# Patient Record
Sex: FEMALE | Race: WHITE | Employment: FULL TIME | URBAN - METROPOLITAN AREA
[De-identification: names, ages, dates, MRNs, and addresses within clinical notes are randomized per-mention and may not be internally consistent; named-entity substitution may affect disease eponyms.]

---

## 2017-12-14 PROBLEM — S82.109A CLOSED FRACTURE OF UPPER END OF TIBIA: Status: ACTIVE | Noted: 2017-12-14

## 2017-12-15 PROBLEM — S82.101A: Status: ACTIVE | Noted: 2017-12-15

## 2017-12-28 DIAGNOSIS — M79.661 PAIN IN RIGHT LOWER LEG: Primary | ICD-10-CM

## 2018-01-11 DIAGNOSIS — M89.8X6 PAIN OF RIGHT TIBIA: Primary | ICD-10-CM

## 2018-01-15 ENCOUNTER — HOSPITAL ENCOUNTER (OUTPATIENT)
Dept: PHYSICAL THERAPY | Age: 58
Discharge: HOME | End: 2018-01-31
Admitting: ORTHOPAEDIC SURGERY

## 2018-01-15 NOTE — PLAN OF CARE
[x]Dermatomes/Myotomes intact    [x]Reflexes equal and normal bilaterally   []Other:    Joint mobility:    []Normal    [x]Hypo-PF, patellar   []Hyper    Palpation: TTP superior incision    Functional Mobility/Transfers: Gisella with w/c and SW    Posture:     Bandages/Dressings/Incisions: incision healed superior and distal portion, still has ~1cm area of scabbing along lateral ant tibia that is filled with granulation tissue but not fully approximated (no drainage noted and pt has been monitoring) and on ~2mm spot distally that is just scabbed. Gait: (include devices/WB status) Pt instructed she could be TTWB with SW, wearing knee immobilizer. Did not bring immobilizer or SW to [facility] (has w/c) and do not have PT order so did not assess gait with SW today. Orthopedic Special Tests:                        [x] Patient history, allergies, meds reviewed. Medical chart reviewed. See intake form. Review Of Systems (ROS):  [x]Performed Review of systems (Integumentary, CardioPulmonary, Neurological) by intake and observation. Intake form has been scanned into medical record. Patient has been instructed to contact their primary care physician regarding ROS issues if not already being addressed at this time.       Co-morbidities/Complexities (which will affect course of rehabilitation):   []None           Arthritic conditions   []Rheumatoid arthritis (M05.9)  []Osteoarthritis (M19.91)   Cardiovascular conditions   []Hypertension (I10)  []Hyperlipidemia (E78.5)  []Angina pectoris (I20)  []Atherosclerosis (I70)   Musculoskeletal conditions   []Disc pathology   []Congenital spine pathologies   [x]Prior surgical intervention  []Osteoporosis (M81.8)  []Osteopenia (M85.8)   Endocrine conditions   []Hypothyroid (E03.9)  []Hyperthyroid Gastrointestinal conditions   []Constipation (J73.85)   Metabolic conditions   []Morbid obesity (E66.01)  []Diabetes type 1(E10.65) or 2 (E11.65)   []Neuropathy (G60.9)     Pulmonary

## 2018-01-18 ENCOUNTER — HOSPITAL ENCOUNTER (OUTPATIENT)
Dept: PHYSICAL THERAPY | Age: 58
Discharge: HOME | End: 2018-01-18
Admitting: ORTHOPAEDIC SURGERY

## 2018-01-18 NOTE — FLOWSHEET NOTE
self care and ADLs  [] (37262) Gait Re-education- Provided training and instruction to the patient for proper LE, core and proximal hip recruitment and positioning and eccentric body weight control with ambulation re-education including up and down stairs     Home Exercise Program:    [x] (30839) Reviewed/Progressed HEP activities related to strengthening, flexibility, endurance, ROM of core, proximal hip and LE for functional self-care, mobility, lifting and ambulation/stair navigation   [] (48190)Reviewed/Progressed HEP activities related to improving balance, coordination, kinesthetic sense, posture, motor skill, proprioception of core, proximal hip and LE for self care, mobility, lifting, and ambulation/stair navigation      Manual Treatments:  PROM / STM / Oscillations-Mobs:  G-I, II, III, IV (PA's, Inf., Post.)  [x] (20210) Provided manual therapy to mobilize LE, proximal hip and/or LS spine soft tissue/joints for the purpose of modulating pain, promoting relaxation,  increasing ROM, reducing/eliminating soft tissue swelling/inflammation/restriction, improving soft tissue extensibility and allowing for proper ROM for normal function with self care, mobility, lifting and ambulation. Modalities:  Declined ice/stim but will at home if inc'd pain    Charges:  Timed Code Treatment Minutes: 43'   Total Treatment Minutes: 43'     [] EVAL (LOW) 43882 (typically 20 minutes face-to-face)  [] EVAL (MOD) 47927 (typically 30 minutes face-to-face)  [] EVAL (HIGH) 32498 (typically 45 minutes face-to-face)  [] RE-EVAL     [x] TG(68557) x  2   [] IONTO  [] NMR (77969) x      [] VASO  [x] Manual (13164) x  1    [] Other:  [] TA x       [] Mech Traction (08695)  [] ES(attended) (25140)      [] ES (un) (57101):     GOALS: Short Term Goals: To be achieved in: 2 weeks  1. Independent in HEP and progression per patient tolerance, in order to prevent re-injury.    2. Patient will have a decrease in pain to facilitate improvement in movement, function, and ADLs as indicated by Functional Deficits. Long Term Goals: To be achieved in: 12 weeks  1. Disability index score of 20% or less for the LEFS to assist with reaching prior level of function. 2. Patient will demonstrate increased AROM to 0-125 to allow for proper joint functioning as indicated by patients Functional Deficits: squatting  3. Patient will demonstrate an increase in Strength to 5/5 good proximal hip strength and control, within 5lb HHD in LE to allow for proper functional mobility as indicated by patients Functional Deficits: ascending/descending steps with reciprocal pattern without AD. 4. Patient will return to ambulating community distances without AD without gait deviation. 5. Pt will participate in [address] [place] [address] for ability to get back to full work duties (lifting/carrying/pushing/pulling, prolonged walking/standing. Progression Towards Functional goals:  [x] Patient is progressing as expected towards functional goals listed. [] Progression is slowed due to complexities listed. [] Progression has been slowed due to co-morbidities. [] Plan just implemented, too soon to assess goals progression  [] Other:     ASSESSMENT:  Pt janeen tx well today. Educated pt on TTWB status and instructed pt on using SW with ambulation at [employer]. Instructed pt to use KI when ambulating at TTWB and to ice 2-3x per day to help with swelling and pain in R knee. Pt shows progress with inc strength performing exercises, specifically SLR flex. Treatment/Activity Tolerance:  [x] Patient tolerated treatment well [] Patient limited by fatique  [] Patient limited by pain  [] Patient limited by other medical complications  [] Other:     Prognosis: [x] Good [] Fair  [] Poor    Patient Requires Follow-up: [x] Yes  [] No    PLAN: Attempt gait training on stairs NV with crutches. Cont to progress exercises NV as janeen.     [x] Continue per plan of care [] Alter current plan (see comments)  [] Plan

## 2018-01-22 ENCOUNTER — HOSPITAL ENCOUNTER (OUTPATIENT)
Dept: PHYSICAL THERAPY | Age: 58
Discharge: HOME | End: 2018-01-22
Admitting: ORTHOPAEDIC SURGERY

## 2018-01-23 NOTE — OP NOTE
advance home exercise program to match level of function. [] Continue rehabitation due to objective improvement and continued functional deficits with progression to work conditioning. [] Discharge to post-rehab program secondary to maximizing \"medical necessity\" of physical / occupational therapy. [] Discharge independent in a home program as:  [] All goals achieved  [] Maximized \"medical necessity\" of physical / occupational therapy  [] No subjective or objective improvements    Plan: Cont to progress ROM, exercises NV as janeen, pending progression of [address] based on MD f/u.   Electronically signed by: Sola Holland, PT, DPT     Physician Recommendations:  [] Follow treatment plan as above [] Discontinue physical therapy  [] Change plan to: _______________________________________________________________

## 2018-01-25 ENCOUNTER — HOSPITAL ENCOUNTER (OUTPATIENT)
Dept: PHYSICAL THERAPY | Age: 58
Discharge: HOME | End: 2018-01-25
Admitting: ORTHOPAEDIC SURGERY

## 2018-01-25 DIAGNOSIS — M89.8X6 PAIN OF RIGHT TIBIA: Primary | ICD-10-CM

## 2018-01-25 NOTE — FLOWSHEET NOTE
[facility], [address] [place]  Phone: [phone]  Fax [phone]    Physical Therapy Daily Treatment Note  Date:  2018    Patient Name:  Cindy Barbosa    :  1960  MRN: [id number]  Restrictions/Precautions:    Medical/Treatment Diagnosis Information:  Diagnosis: S82.141D (ICD-10-CM) - Displaced bicondylar fracture of right tibia, subsequent encounter for closed fracture with routine healing  Treatment Diagnosis: R knee pain M25.561, R knee stiffness M25.661, difficulty walking B34.3  Insurance/Certification information:     Physician Information:  Referring Practitioner: marta  Plan of care signed (Y/N):     Date of Patient follow up with Physician: 18    G-Code (if applicable):   CM   Date G-Code Applied:  1/15/18  PT G-Codes  Functional Assessment Tool Used: LEFS  Score: 78%  Functional Limitation: Mobility: Walking and moving around  Mobility: Walking and Moving Around Current Status (): At least 80 percent but less than 100 percent impaired, limited or restricted  Mobility: Walking and Moving Around Goal Status (): At least 20 percent but less than 40 percent impaired, limited or restricted    Progress Note: [x]  Yes  []  No  Next due by: Visit #10       Latex Allergy:  [x]NO      []YES  Preferred Language for Healthcare:   [x]English       []other:     Visit # Insurance Allowable Requires auth   4 30    [x]no        []yes:       Pain level:  0/10     SUBJECTIVE: Pt had f/u with MD, who states she's healing well on xrays. She can progress [address] to 25% and he is still not sure about return to work in early February but will see her again 17.     OBJECTIVE:   Observation: Gr I edema, gr II edema med/lat tib distal and lat to incision, pitting edema gr II to mid calf; - Hang's; incision fully closed now, scabbing has fallen off; restriction of skin/fascia and scar noted  Test measurements: -4-104 deg knee flex with knee AAOM flex on SB with strap ,ext to 0 following mobs, stretching    RESTRICTIONS/PRECAUTIONS: s/p R tibial plateau fx ORIF DOS 36/48/35; Per MD 25% WB'ing as of 1/24/18 and can progress by 25% weekly.      Exercises/Interventions:     Therapeutic Ex Sets/sec Reps Notes   Pt ed: 25% WB'ing and progression each week, need to build quad/glute strength so she can WB better, use of SW for comm dis, crutches around house as able      Quad iso-started with towel under knee then removed once inc'd ext ROM 10\"  10\" 10  10    Knee AAROM flex/ext on SB  With strap  10\"  10\" x10  x10    Seated LE dangle (assist from LLE)    SLR-AROM  10    SL hip abd  10    SL hip add  10    bridge 5\" 2x10    Long-sitting gastroc s  HS stretch 30\"x3 ea  progress to standing st NV   Standing hip abd, flex, march  NV add ext if able to keep quad iso  x15 ea With AT  L stance, R active  Table-side   Standing knee flex  3\"x10 With AT  L stance, R active   Seated R LE flexion 5\" x10 100 deg   SAQ  LAQ-AAROM initially 5\"  5\" 2x10  x10    gait training (25% WB)      stair training  10'  With AT  Backwards up steps  Forwards down          bike for ROM 5'  Full revolutions after 2-3 min   Manual Intervention      Pat mobs 5'  instructed pt for HEP   PROM R knee flex, ext 4'     STM peroneals, medial shin along post tib 10'                       NMR re-education                                              Therapeutic Exercise and NMR EXR  [x] (15036) Provided verbal/tactile cueing for activities related to strengthening, flexibility, endurance, ROM for improvements in LE, proximal hip, and core control with self care, mobility, lifting, ambulation.  [] (29345) Provided verbal/tactile cueing for activities related to improving balance, coordination, kinesthetic sense, posture, motor skill, proprioception  to assist with LE, proximal hip, and core control in self care, mobility, lifting, ambulation and eccentric single leg control. NMR and Therapeutic Activities:    [] (05284 or 26148) Provided verbal/tactile cueing for activities related to improving balance, coordination, kinesthetic sense, posture, motor skill, proprioception and motor activation to allow for proper function of core, proximal hip and LE with self care and ADLs  [x] (67250) Gait Re-education- Provided training and instruction to the patient for proper LE, core and proximal hip recruitment and positioning and eccentric body weight control with ambulation re-education including up and down stairs     Home Exercise Program:    [x] (88934) Reviewed/Progressed HEP activities related to strengthening, flexibility, endurance, ROM of core, proximal hip and LE for functional self-care, mobility, lifting and ambulation/stair navigation   [] (22983)Reviewed/Progressed HEP activities related to improving balance, coordination, kinesthetic sense, posture, motor skill, proprioception of core, proximal hip and LE for self care, mobility, lifting, and ambulation/stair navigation      Manual Treatments:  PROM / STM / Oscillations-Mobs:  G-I, II, III, IV (PA's, Inf., Post.)  [x] (05332) Provided manual therapy to mobilize LE, proximal hip and/or LS spine soft tissue/joints for the purpose of modulating pain, promoting relaxation,  increasing ROM, reducing/eliminating soft tissue swelling/inflammation/restriction, improving soft tissue extensibility and allowing for proper ROM for normal function with self care, mobility, lifting and ambulation.      Modalities: PM and vaso with elevation x15'    Charges:  Timed Code Treatment Minutes: 40'   Total Treatment Minutes: 54'     [] EVAL (LOW) 04730 (typically 20 minutes face-to-face)  [] EVAL (MOD) 58759 (typically 30 minutes face-to-face)  [] EVAL (HIGH) 90185 (typically 45 minutes face-to-face)  [] RE-EVAL     [x] KN(72460) x  2   [] IONTO  [] NMR (18080) x      [] VASO-with estim  [x] Manual (71979) x  1    [] Other:Gait x1  [] TA x       [] [facility] Traction (61186)  [] ES(attended) (57764)      [x] ES (un) (85937):     GOALS: Short Term Goals: To be achieved in: 2 weeks  1. Independent in HEP and progression per patient tolerance, in order to prevent re-injury. 2. Patient will have a decrease in pain to facilitate improvement in movement, function, and ADLs as indicated by Functional Deficits. Long Term Goals: To be achieved in: 12 weeks  1. Disability index score of 20% or less for the LEFS to assist with reaching prior level of function. 2. Patient will demonstrate increased AROM to 0-125 to allow for proper joint functioning as indicated by patients Functional Deficits: squatting  3. Patient will demonstrate an increase in Strength to 5/5 good proximal hip strength and control, within 5lb HHD in LE to allow for proper functional mobility as indicated by patients Functional Deficits: ascending/descending steps with reciprocal pattern without AD. 4. Patient will return to ambulating community distances without AD without gait deviation. 5. Pt will participate in [employer] for ability to get back to full work duties (lifting/carrying/pushing/pulling, prolonged walking/standing. Progression Towards Functional goals:  [x] Patient is progressing as expected towards functional goals listed. [] Progression is slowed due to complexities listed. [] Progression has been slowed due to co-morbidities. [] Plan just implemented, too soon to assess goals progression  [] Other:     ASSESSMENT:  Pt's ROM is improving and pain remains well managed with progression to 25% WB'ing this date. She was able to demonstrate safe mobility on steps with crutches with AT for SBA. She was able to revolve fully on bike after warm-up. Her RLE edema improved with mobility this tx, but added stim/vaso for pain and edema given inc'd exercise and WB'ing this date.  She will benefit from continued proximal strength and quad activation to improve her gait

## 2018-01-29 ENCOUNTER — HOSPITAL ENCOUNTER (OUTPATIENT)
Dept: PHYSICAL THERAPY | Age: 58
Discharge: HOME | End: 2018-01-29
Admitting: ORTHOPAEDIC SURGERY

## 2018-01-29 NOTE — FLOWSHEET NOTE
fallen off; restriction of skin/fascia and scar noted  Test measurements: -4-105 deg knee flex with knee AAOM flex on SB with strap ,ext to 0 following mobs, stretching    RESTRICTIONS/PRECAUTIONS: s/p R tibial plateau fx ORIF DOS 74/50/91; Per MD 25% [address] as of 1/24/18 and can progress by 25% weekly.      Exercises/Interventions:     Therapeutic Ex Sets/sec Reps Notes   Pt ed: 25% WB'ing and progression each week, need to build quad/glute strength so she can WB better, use of SW for comm dis, crutches around house as able, still need to use crutches/AD at all times d/t 25% WB status 2'     Quad iso-started with no towel, then towel under her ankle once inc'd ext ROM 10\"  10\" 5  5    Knee AAROM flex/ext on SB  With strap  10\"  10\" x10  x10    Seated LE dangle (assist from LLE)    SLR-AROM 2  2 10  10   With 1.5 # above knee   SL hip abd 3 10 tactile cues needed for form   SL hip add 2 10    bridge 5\" 2x10    Long-sitting gastroc s  HS stretch 30\"x3 ea  progress to standing st NV   Standing hip abd, flex, ext,     2x10 ea   L stance, R active  Table-side   Standing knee flex   With AT  L stance, R active   Seated R LE flexion 100 deg   SAQ  LAQ-AROM  5\"  5\" 2x10  x10    gait training (25% WB)      stair training    NV increase to 50% WB  Backwards up steps  Forwards down          bike for ROM 5'  Full revolutions after 2-3 min   Manual Intervention      Pat mobs 5'  instructed pt for HEP   PROM R knee flex, ext 4'     STM peroneals, medial shin along post tib 10'                       NMR re-education                                              Therapeutic Exercise and NMR EXR  [x] (65793) Provided verbal/tactile cueing for activities related to strengthening, flexibility, endurance, ROM for improvements in LE, proximal hip, and core control with self care, mobility, lifting, ambulation.  [] (46179) Provided verbal/tactile cueing for activities related to improving balance, coordination, kinesthetic sense, posture, face-to-face)  [] RE-EVAL     [x] GX(59283) x  2   [] IONTO  [] NMR (69445) x      [x] VASO  [x] Manual (19921) x  1    [] Other:Gait x1  [] TA x       [] Mech Traction (80491)  [] ES(attended) (87804)      [] ES (un) (03849):     GOALS: Short Term Goals: To be achieved in: 2 weeks  1. Independent in HEP and progression per patient tolerance, in order to prevent re-injury. 2. Patient will have a decrease in pain to facilitate improvement in movement, function, and ADLs as indicated by Functional Deficits. Long Term Goals: To be achieved in: 12 weeks  1. Disability index score of 20% or less for the LEFS to assist with reaching prior level of function. 2. Patient will demonstrate increased AROM to 0-125 to allow for proper joint functioning as indicated by patients Functional Deficits: squatting  3. Patient will demonstrate an increase in Strength to 5/5 good proximal hip strength and control, within 5lb HHD in LE to allow for proper functional mobility as indicated by patients Functional Deficits: ascending/descending steps with reciprocal pattern without AD. 4. Patient will return to ambulating community distances without AD without gait deviation. 5. Pt will participate in [address] [place] [address] for ability to get back to full work duties (lifting/carrying/pushing/pulling, prolonged walking/standing. Progression Towards Functional goals:  [x] Patient is progressing as expected towards functional goals listed. [] Progression is slowed due to complexities listed. [] Progression has been slowed due to co-morbidities. [] Plan just implemented, too soon to assess goals progression  [] Other:     ASSESSMENT:  Pt did well with today's session. She did report some pain with end range knee ROM, but did not push through painful range. Mild limitation in knee ext remains, but improves with MTT. Able to increase reps with exercises today. Did not complete PM in PT error, but may continue NV for pain control.  She will benefit

## 2018-02-01 ENCOUNTER — HOSPITAL ENCOUNTER (OUTPATIENT)
Dept: PHYSICAL THERAPY | Age: 58
Discharge: HOME | End: 2018-02-02
Admitting: ORTHOPAEDIC SURGERY

## 2018-02-01 ENCOUNTER — HOSPITAL ENCOUNTER (OUTPATIENT)
Dept: PHYSICAL THERAPY | Age: 58
Discharge: HOME | End: 2018-02-28
Attending: ORTHOPAEDIC SURGERY | Admitting: ORTHOPAEDIC SURGERY

## 2018-02-01 NOTE — FLOWSHEET NOTE
bilateral crutches- focusing on heel-toe gait, quad contraction for stance phase, crutch sequencing 10'  Pt exhausted by end of this   NV stair training with crutches                                  Therapeutic Exercise and NMR EXR  [x] (40405) Provided verbal/tactile cueing for activities related to strengthening, flexibility, endurance, ROM for improvements in LE, proximal hip, and core control with self care, mobility, lifting, ambulation.  [] (00917) Provided verbal/tactile cueing for activities related to improving balance, coordination, kinesthetic sense, posture, motor skill, proprioception  to assist with LE, proximal hip, and core control in self care, mobility, lifting, ambulation and eccentric single leg control.      NMR and Therapeutic Activities:    [] (23494 or 75183) Provided verbal/tactile cueing for activities related to improving balance, coordination, kinesthetic sense, posture, motor skill, proprioception and motor activation to allow for proper function of core, proximal hip and LE with self care and ADLs  [x] (73684) Gait Re-education- Provided training and instruction to the patient for proper LE, core and proximal hip recruitment and positioning and eccentric body weight control with ambulation re-education including up and down stairs     Home Exercise Program:    [x] (97693) Reviewed/Progressed HEP activities related to strengthening, flexibility, endurance, ROM of core, proximal hip and LE for functional self-care, mobility, lifting and ambulation/stair navigation   [] (73723)Reviewed/Progressed HEP activities related to improving balance, coordination, kinesthetic sense, posture, motor skill, proprioception of core, proximal hip and LE for self care, mobility, lifting, and ambulation/stair navigation      Manual Treatments:  PROM / STM / Oscillations-Mobs:  G-I, II, III, IV (PA's, Inf., Post.)  [x] (38377) Provided manual therapy to mobilize LE, proximal hip and/or LS spine soft is progressing as expected towards functional goals listed. [] Progression is slowed due to complexities listed. [] Progression has been slowed due to co-morbidities. [] Plan just implemented, too soon to assess goals progression  [] Other:     ASSESSMENT:  Pt is able to achieve full knee ext fairly easily with stretches, quad sets. However, her flexion ROM is still fairly painful and restricted to ~105 today. Worked on gait training with her crutches as she may be 50% WB as of 1/31/18. She did not have increased pain, but she required extensive cueing for gait sequencing as she had a tendency to allow her R knee to unlock and go limp when in stance phase. She will benefit from continued proximal strength and quad activation to improve her gait for progression of WB'ing. She really wants to go back to work full-duty on 2/12/18, but do not anticipate pt will be ready by this time in order to safely perform her job duties. Spoke with pt today on this subject and she did become emotional about it. Tried to reinforce that she will want to return to work when she is fully ready, not just getting back as soon as she is off of the crutches.     Treatment/Activity Tolerance:  [x] Patient tolerated treatment well [] Patient limited by fatique  [] Patient limited by pain  [] Patient limited by other medical complications  [] Other:     Prognosis: [x] Good [] Fair  [] Poor    Patient Requires Follow-up: [x] Yes  [] No    PLAN: Progress ROM, prox strength while maintaining [address] prec's- progress to 75% WB 2/7/18  [x] Continue per plan of care [] Alter current plan (see comments)  [] Plan of care initiated [] Hold pending MD visit [] Discharge    Electronically signed by: Ginger Pablo, PT, DPT

## 2018-02-05 ENCOUNTER — HOSPITAL ENCOUNTER (OUTPATIENT)
Dept: PHYSICAL THERAPY | Age: 58
Discharge: HOME | End: 2018-02-06
Admitting: ORTHOPAEDIC SURGERY

## 2018-02-05 NOTE — FLOWSHEET NOTE
[facility], [address]  Phone: [phone]  Fax [phone]    Physical Therapy Daily Treatment Note  Date:  2018    Patient Name:  Gino Medrano    :  1960  MRN: [id number]  Restrictions/Precautions:    Medical/Treatment Diagnosis Information:  Diagnosis: S82.141D (ICD-10-CM) - Displaced bicondylar fracture of right tibia, subsequent encounter for closed fracture with routine healing  Treatment Diagnosis: R knee pain M25.561, R knee stiffness M25.661, difficulty walking T62.7  Insurance/Certification information:     Physician Information:  Referring Practitioner: marta  Plan of care signed (Y/N):     Date of Patient follow up with Physician: 18    G-Code (if applicable):   CM   Date G-Code Applied:  1/15/18  PT G-Codes  Functional Assessment Tool Used: LEFS  Score: 78%  Functional Limitation: Mobility: Walking and moving around  Mobility: Walking and Moving Around Current Status (): At least 80 percent but less than 100 percent impaired, limited or restricted  Mobility: Walking and Moving Around Goal Status (): At least 20 percent but less than 40 percent impaired, limited or restricted    Progress Note: [x]  Yes  []  No  Next due by: Visit #10       Latex Allergy:  [x]NO      []YES  Preferred Language for Healthcare:   [x]English       []other:     Visit # Insurance Allowable Requires auth   7 30    [x]no        []yes:       Pain level:  0/10     SUBJECTIVE: Pt reports soreness following her exercises but no pain just with ambulation around her home.  She is very frustrated that she may still not be able to go back to work on Monday, is optimistic if she pushes herself she could (but she does admit that it is expecting too much and she will listen to advice of PT and MD.)     OBJECTIVE:   Observation: Gr I edema, gr I edema med/lat tib distal and lat to incision, no pitting edema noted; incision is healing well but restriction of skin/fascia and scar noted  Test measurements: -2-105 deg knee flex with knee AAOM flex on SB with strap ,ext to 0 following mobs, stretching (did not measure flexion again after biking)    RESTRICTIONS/PRECAUTIONS: s/p R tibial plateau fx ORIF DOS 04/93/10; Per MD 25% [address] as of 1/24/18 and can progress by 25% weekly. (75% WB'ing as of 2/7/18)    Exercises/Interventions:     Therapeutic Ex Sets/sec Reps Notes   Pt ed: 50-75% WB'ing progression this week, need to build quad/glute strength so she can WB better, use of SW for comm dis still if fatigued and crutches around house as able, still need to use crutches/AD at all times d/t 75% WB status; she likely will not be ready to return to work the second week of February as she will likely still be working on walking normally at that time; it is important to return to work when she is fully ready and not to push it/cause increased pain because she returns too quickly. 10'  Pt tearful at fact she may not be able to go back to work yet next week.    Quad iso-started with no towel, then towel under her ankle once inc'd ext ROM 10\"  10\" 10  10    Knee AAROM flex/ext on SB  With strap on mat table 10\"  10\" x10  x10    Seated LE dangle (assist from LLE)    SLR-AROM   2   10   With 1.5 # above knee   SL hip abd- bobbi wall slide 3 10 tactile cues needed for form   SL hip add 2 10 1.5#    HEP   Bridge  Sl bridge-RLE  5\" 2x10  x10 With green band above knees   Standing gastroc s  HS stretch-long-sitting 30\"x3 ea     Standing hip abd, flex, ext,     2x10 ea L stance, R active  Table-side   Standing knee flex  3\"2x10 L stance, R active   Seated R LE flexion 100 deg   SAQ with VMO squeeze  LAQ-AROM with VMO squeeze 5\"  5\" 2x10  2x10    Mini squats  2x10 Chair for cueing, attempt for 50% weight through RLE                bike for ROM 5'  Non-billable after set-up  Full revolutions    Manual Intervention      Pat mobs 5' to full work duties (lifting/carrying/pushing/pulling, prolonged walking/standing. Progression Towards Functional goals:  [x] Patient is progressing as expected towards functional goals listed. [] Progression is slowed due to complexities listed. [] Progression has been slowed due to co-morbidities. [] Plan just implemented, too soon to assess goals progression  [] Other:     ASSESSMENT:  Pt tolerated progression of WB'ing to around 75% while working on weight shifting with UE support on table, therefore added inc'd amb distance for gait training with B crutches. She was able to demonstrate inc'd quad activation in stance phase with consisted cueing while working with AT. Pt had min discomfort with SAQ/LAQ around medial patella that improved with ball squeeze to recruit VMO. Added mini squats this date, pt had min medial knee pain but again dissipated with focus on LE alignment. She will benefit from continued proximal strength and quad activation to improve her gait for progression of WB'ing. 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 Pt is still very discouraged about fact she may not be ready to work, but is agreeable to recommendations from PT and MD at her f/u on Wednesday. Treatment/Activity Tolerance:  [x] Patient tolerated treatment well [] Patient limited by fatique  [] Patient limited by pain  [] Patient limited by other medical complications  [] Other:     Prognosis: [x] Good [] Fair  [] Poor    Patient Requires Follow-up: [x] Yes  [] No    PLAN: Progress ROM, prox strength while maintaining 75% WB'ing this week. Has f/u with MD on Wednesday after PT-will need OP note.   [x] Continue per plan of care [] Alter current plan (see comments)  [] Plan of care initiated [] Hold pending MD visit [] Discharge    Electronically signed by:

## 2018-02-08 ENCOUNTER — HOSPITAL ENCOUNTER (OUTPATIENT)
Dept: PHYSICAL THERAPY | Age: 58
Discharge: HOME | End: 2018-02-09
Admitting: ORTHOPAEDIC SURGERY

## 2018-02-08 DIAGNOSIS — M89.8X6 PAIN OF RIGHT TIBIA: Primary | ICD-10-CM

## 2018-02-08 NOTE — FLOWSHEET NOTE
focusing on heel-toe gait, quad contraction for stance phase, crutch sequencing 8'       stair training with crutches 4'     Also adjusted crutches for 1 notch higher and hand  1 notch lower 3'  Improved shoulder/wrist comfort after adjustment                         Therapeutic Exercise and NMR EXR  [x] (34339) Provided verbal/tactile cueing for activities related to strengthening, flexibility, endurance, ROM for improvements in LE, proximal hip, and core control with self care, mobility, lifting, ambulation.  [] (97948) Provided verbal/tactile cueing for activities related to improving balance, coordination, kinesthetic sense, posture, motor skill, proprioception  to assist with LE, proximal hip, and core control in self care, mobility, lifting, ambulation and eccentric single leg control.      NMR and Therapeutic Activities:    [] (89929 or 61653) Provided verbal/tactile cueing for activities related to improving balance, coordination, kinesthetic sense, posture, motor skill, proprioception and motor activation to allow for proper function of core, proximal hip and LE with self care and ADLs  [x] (25334) Gait Re-education- Provided training and instruction to the patient for proper LE, core and proximal hip recruitment and positioning and eccentric body weight control with ambulation re-education including up and down stairs     Home Exercise Program:    [x] (15725) Reviewed/Progressed HEP activities related to strengthening, flexibility, endurance, ROM of core, proximal hip and LE for functional self-care, mobility, lifting and ambulation/stair navigation   [] (74902)Reviewed/Progressed HEP activities related to improving balance, coordination, kinesthetic sense, posture, motor skill, proprioception of core, proximal hip and LE for self care, mobility, lifting, and ambulation/stair navigation      Manual Treatments:  PROM / STM / Oscillations-Mobs:  G-I, II, III, IV (PA's, Inf., Post.)  [x] (85027) Provided walking/standing. Progression Towards Functional goals:  [x] Patient is progressing as expected towards functional goals listed. [] Progression is slowed due to complexities listed. [] Progression has been slowed due to co-morbidities. [] Plan just implemented, too soon to assess goals progression  [] Other:     ASSESSMENT:  Pt has functional ROM after manual tx and stretching, however still has some stiffness at end range flexion and extension. Her gait with B crutches is much improved and she is ambulating with 75% WB'ing on RLE household distances and on steps with step-to pattern with supervision. She was able to progress quad/hip strength this date, still has pain with resisted HS work so will work to progress this. 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 Pt is still very discouraged about fact she may not be ready to work, but is agreeable to recommendations from PT and MD at her f/u today. Treatment/Activity Tolerance:  [x] Patient tolerated treatment well [] Patient limited by fatique  [] Patient limited by pain  [] Patient limited by other medical complications  [] Other:     Prognosis: [x] Good [] Fair  [] Poor    Patient Requires Follow-up: [x] Yes  [] No    PLAN: Op note written for MD f/u; will progress as able given any new WB'ing allowance.     [x] Continue per plan of care [] Alter current plan (see comments)  [] Plan of care initiated [] Hold pending MD visit [] Discharge    Electronically signed by: Abeba Leyva, PT, DPT

## 2018-02-12 ENCOUNTER — HOSPITAL ENCOUNTER (OUTPATIENT)
Dept: PHYSICAL THERAPY | Age: 58
Discharge: HOME | End: 2018-02-13
Admitting: ORTHOPAEDIC SURGERY

## 2018-02-12 NOTE — FLOWSHEET NOTE
(15029) Reviewed/Progressed HEP activities related to strengthening, flexibility, endurance, ROM of core, proximal hip and LE for functional self-care, mobility, lifting and ambulation/stair navigation   [] (41031)Reviewed/Progressed HEP activities related to improving balance, coordination, kinesthetic sense, posture, motor skill, proprioception of core, proximal hip and LE for self care, mobility, lifting, and ambulation/stair navigation      Manual Treatments:  PROM / STM / Oscillations-Mobs:  G-I, II, III, IV (PA's, Inf., Post.)  [x] (42578) Provided manual therapy to mobilize LE, proximal hip and/or LS spine soft tissue/joints for the purpose of modulating pain, promoting relaxation,  increasing ROM, reducing/eliminating soft tissue swelling/inflammation/restriction, improving soft tissue extensibility and allowing for proper ROM for normal function with self care, mobility, lifting and ambulation. Modalities: PM and vasowith elevation x15'    Charges:  Timed Code Treatment Minutes: 45   Total Treatment Minutes:  65 (PM, bike)     [] EVAL (LOW) 39822 (typically 20 minutes face-to-face)  [] EVAL (MOD) 67732 (typically 30 minutes face-to-face)  [] EVAL (HIGH) 30627 (typically 45 minutes face-to-face)  [] RE-EVAL     [x] BH(14147) x  2   [] IONTO  [] NMR (66216) x      [] VASO  [x] Manual (83818) x  1    [] Other:Gait x1  [] TA x       [] Mech Traction (75297)  [] ES(attended) (47734)      [x] ES (un) (25848):     GOALS: Short Term Goals: To be achieved in: 2 weeks  1. Independent in HEP and progression per patient tolerance, in order to prevent re-injury. 2. Patient will have a decrease in pain to facilitate improvement in movement, function, and ADLs as indicated by Functional Deficits. Long Term Goals: To be achieved in: 12 weeks  1. Disability index score of 20% or less for the LEFS to assist with reaching prior level of function.    2. Patient will demonstrate increased AROM to 0-125 to allow for

## 2018-02-15 ENCOUNTER — HOSPITAL ENCOUNTER (OUTPATIENT)
Dept: PHYSICAL THERAPY | Age: 58
Discharge: HOME | End: 2018-02-16
Admitting: ORTHOPAEDIC SURGERY

## 2018-02-15 NOTE — FLOWSHEET NOTE
[facility], [address] [place]  Phone: [phone]  Fax [phone]    Physical Therapy Daily Treatment Note  Date:  2/15/2018    Patient Name:  Eliazar Powers    :  1960  MRN: [id number]  Restrictions/Precautions:    Medical/Treatment Diagnosis Information:  Diagnosis: S82.141D (ICD-10-CM) - Displaced bicondylar fracture of right tibia, subsequent encounter for closed fracture with routine healing  Treatment Diagnosis: R knee pain M25.561, R knee stiffness M25.661, difficulty walking Y83.7  Insurance/Certification information:     Physician Information:  Referring Practitioner: marta  Plan of care signed (Y/N):     Date of Patient follow up with Physician: 18    G-Code (if applicable):   CM   Date G-Code Applied:  1/15/18  PT G-Codes  Functional Assessment Tool Used: LEFS  Score: 78%  Functional Limitation: Mobility: Walking and moving around  Mobility: Walking and Moving Around Current Status (): At least 80 percent but less than 100 percent impaired, limited or restricted  Mobility: Walking and Moving Around Goal Status (): At least 20 percent but less than 40 percent impaired, limited or restricted    Progress Note: [x]  Yes  []  No  Next due by: Visit #10       Latex Allergy:  [x]NO      []YES  Preferred Language for Healthcare:   [x]English       []other:     Visit # Insurance Allowable Requires auth   9-prog note NV  30    [x]no        []yes:       Pain level:  0-1/10     SUBJECTIVE: Pt was not as sore after LV, she still feels some pulling around her incision and shin. OBJECTIVE:   Observation: Gr I edema R knee, gr I edema med/lat tib distal and lat to incision, no pitting edema noted; incision is healing well but restriction of skin/fascia and scar noted; gait with B crutches, lacks TKE in stance phase of gait  Test measurements: AROM 0-125 deg after stretching. Oscillations-Mobs:  G-I, II, III, IV (PA's, Inf., Post.)  [x] (95028) Provided manual therapy to mobilize LE, proximal hip and/or LS spine soft tissue/joints for the purpose of modulating pain, promoting relaxation,  increasing ROM, reducing/eliminating soft tissue swelling/inflammation/restriction, improving soft tissue extensibility and allowing for proper ROM for normal function with self care, mobility, lifting and ambulation. Modalities: PM and vasowith elevation x15'    Charges:  Timed Code Treatment Minutes: 55   Total Treatment Minutes: 70 (PM, bike)     [] EVAL (LOW) 71722 (typically 20 minutes face-to-face)  [] EVAL (MOD) 00858 (typically 30 minutes face-to-face)  [] EVAL (HIGH) 72995 (typically 45 minutes face-to-face)  [] RE-EVAL     [x] TA(72948) x  2   [] IONTO  [] NMR (65964) x      [] VASO-with stim not billed  [x] Manual (00721) x  1    [x] Other:Gait x1  [] TA x       [] Mech Traction (98007)  [] ES(attended) (30283)      [x] ES (un) (77796):     GOALS: Short Term Goals: To be achieved in: 2 weeks  1. Independent in HEP and progression per patient tolerance, in order to prevent re-injury. 2. Patient will have a decrease in pain to facilitate improvement in movement, function, and ADLs as indicated by Functional Deficits. Long Term Goals: To be achieved in: 12 weeks  1. Disability index score of 20% or less for the LEFS to assist with reaching prior level of function. 2. Patient will demonstrate increased AROM to 0-125 to allow for proper joint functioning as indicated by patients Functional Deficits: squatting  3. Patient will demonstrate an increase in Strength to 5/5 good proximal hip strength and control, within 5lb HHD in LE to allow for proper functional mobility as indicated by patients Functional Deficits: ascending/descending steps with reciprocal pattern without AD. 4. Patient will return to ambulating community distances without AD without gait deviation.    5. Pt will

## 2018-02-15 NOTE — FLOWSHEET NOTE
Inv.        On ground   Hip abd, flex, ext 2x10 ea  R hip abd 0# 2x10     ESTRADA   Flex                  ABd                  ADd                 TKE                  Ext              Steps Up                  Up and Over                  Down                  Lateral                  Rotation              Squats  mini                     wall                    BOSU               Lunges:  Lunge to Balance                      Balance to Lunge                      Walking              Knee Extension Bilat. Ecc.                                  Inv. Hamstring Curls Bilat. Ecc.                                  Inv.              Soleus Press Bilat. Ecc.                              Inv.                                      Ladders                   Square                  Jump/Hop  Low                         Med.                         High                                                                     Modality ES/Vpulse 15' ES/CP 15' ES/VPulse 15' Back to PT   Initials                             SA  JLW JLW SA    Time spent with PT assistant

## 2018-02-19 ENCOUNTER — HOSPITAL ENCOUNTER (OUTPATIENT)
Dept: PHYSICAL THERAPY | Age: 58
Discharge: HOME | End: 2018-02-20
Admitting: ORTHOPAEDIC SURGERY

## 2018-02-19 NOTE — FLOWSHEET NOTE
[facility] [place]  [address] [place] [address] [place]  Phone: [phone]  Fax [phone]      ATHLETIC TRAINING [address]  Date:  2018    Patient Name:  Lori Nava    :  1960  MRN: [id number]  Restrictions/Precautions:    Medical/Treatment Diagnosis Information:  · Diagnosis:  - Displaced bicondylar fracture of right tibia, subsequent encounter for closed fracture with routine healing  · Treatment Diagnosis: R knee pain , R knee stiffness M25.661, difficulty walking   ·   Physician Information:  Dr. Sharon [place] Post-op  8 wks  12 wks 16 wks 20 wks   24 wks                            Activity Log                                                  DOS/DOI:                                                    Date: 2/5/18 2/12/18 2/15/18  2/19/18   ATC communication 75% WB Pt is now FWB as janeen. Has difficulty activating quad into full ext w/WB exercises or gait. Down to 1 crutch   Bike       Elliptical       Treadmill  AlterG shorts L  cockpit 7, 50% BW  walking at 0.8 mph for 5' for gait  gradual return to 100% BW at 0 mph, small steps in place AlterG shorts L  cockpit 7, 70% BW  walking at 0.8 mph for 5' for gait  gradual return to 100% BW at 0 mph, small steps in place Gait training 1 crutch around clinic twice   Airdyne              Gastroc stretch       Soleus stretch       Hamstring stretch       ITB stretch       Hip Flexor stretch       Quad stretch       Adductor stretch        Gait training and steps training 10'       Weight Shifting sp                                 fp                                 tp       Lateral walking (with/w/o TB)              Balance: PEP/Rachel board                      SLS    tandem R/L 3x15\" ea         Star excursion load/explode             Extremity reach UE/LE              Leg Press Bhavesh. 70# 25x                     Ecc.                         Inv.               Calf

## 2018-02-19 NOTE — PROGRESS NOTES
[facility], [address]  Phone: [phone]  Fax [phone]     Physical Therapy Re-Certification Plan of Care    Dear Referring Practitioner: marta,    We had the pleasure of treating the following patient for physical therapy services at [address] [place] [address]. A summary of our findings can be found in the updated assessment below. This includes our plan of care. If you have any questions or concerns regarding these findings, please do not hesitate to contact me at the office phone number checked above. Thank you for the referral.     Physician Signature:________________________________Date:__________________  By signing above, therapists plan is approved by physician      Patient: Susanne Petersen   : 1960   MRN: [id number]  Referring Physician: Referring Practitioner: marta      Evaluation Date: 2018      Medical Diagnosis Information:  · Diagnosis: S82.141D (ICD-10-CM) - Displaced bicondylar fracture of right tibia, subsequent encounter for closed fracture with routine healing   · Treatment Diagnosis: R knee pain M25.561, R knee stiffness M25.661, difficulty walking R26.2   Insurance information: PT Insurance Information: [facility]/[place]     Date Range:1/15/18-18  Total visits:10      G-Codes: (if applicable) PT G-Codes  Functional Assessment Tool Used: LEFS  Score: 53%  Functional Limitation: Mobility: Walking and moving around  Mobility: Walking and Moving Around Current Status (): At least 40 percent but less than 60 percent impaired, limited or restricted  Mobility: Walking and Moving Around Goal Status ():  At least 20 percent but less than 40 percent impaired, limited or restricted   Functional Index used: LEFS    SUBJECTIVE: Pt feels her pain is better managed, she can walk around her house with B crutches but takes SW for community distance amb and she unable to adhere to initial POC    Functional deficiencies/Impairements which affect ADL's and Reduce overall function:     [x]decreased core/proximal hip strength and neuromuscular control - Reduced  overall function   [x]decreased LE ROM/joint mobility- Reduced overall Function    [x]decreased LE strength- Reduced overall function with gait and steps   [x]difficulty with SLS- Reduced overall function and possible falls risk   [x]pain/difficulty with ambulation- reduced overall function and mobility   [x]unable to perform sport/recreational activity due to pain and dysfunction   []other:       Prognosis/Rehab Potential:    []Excellent   [x]Good    []Fair   []Poor: Toleration of evaluation or treatment:    []Excellent   [x]Good    []Fair   []Poor     New or Updated Goals (if applicable):  [x] No change to goals established upon initial eval/last progress note:  New Goals:    GOALS: Short Term Goals: To be achieved in: 2 weeks  1. Independent in HEP and progression per patient tolerance, in order to prevent re-injury. met  2. Patient will have a decrease in pain to facilitate improvement in movement, function, and ADLs as indicated by Functional Deficits. met     Long Term Goals: To be achieved in: 12 weeks  1. Disability index score of 20% or less for the LEFS to assist with reaching prior level of function. progress  2. Patient will demonstrate increased AROM to 0-125 to allow for proper joint functioning as indicated by patients Functional Deficits: squatting progress  3. Patient will demonstrate an increase in Strength to 5/5 good proximal hip strength and control, within 5lb HHD in LE to allow for proper functional mobility as indicated by patients Functional Deficits: ascending/descending steps with reciprocal pattern without AD. progress  4. Patient will return to ambulating community distances without AD without gait deviation.  Progress, still using RW for community amb, B crutches for around her home and working on dec'ing to just 1 crutch for short household amb. 5. Pt will participate in [employer] for ability to get back to full work duties (lifting/carrying/pushing/pulling, prolonged walking/standing. not met     Rehab Potential:   []Excellent   [x] Good   [] Fair   [] Poor    Plan of Care:  [x] Continue Current Therapy Intervention    Frequency/Duration:  2 days per week for 5 Weeks:  HEP instruction:   1. Ther ex including: strength training, ROM, NMR and proprioception for the proximal hip, core and Lower extremity  2. Manual therapy as indicated including Dry Needling/IASTM, STM, PROM, Gr I-IV mobilizations, spinal mobilization/manipulation. 3. Modalities as needed including: thermal agents, E-stim, US, iontophoresis as indicated. 4. Patient education on joint protection, activity modification, progression of HEP.    Electronically signed by:  Dieudonne Castañeda, PT, DPT

## 2018-02-19 NOTE — FLOWSHEET NOTE
from AD as janeen    Exercises/Interventions:     Therapeutic Ex Sets/sec Reps Notes   Pt ed: WBAT progression this week, need to build quad/glute strength so she can WB better, use of SW for comm dis still if fatigued and crutches around house as able, still need to use crutches/AD at all times as she is not strong enough still ; ok for 15-20 min bike at the gym   Pt tearful at fact she is not able to go back to work yet   [employer] iso 10\"   10    Knee AAROM flex/ext on SB  With strap on mat table 10\"  10\" x10  x10    Seated LE dangle (assist from LLE)    SLR-AROM 2 10 With 2# mid calf   SL hip abd- bobbi wall slide 2 10 2#   SL hip add 2 10 2#    HEP   Bridge  Sl bridge-RLE with CL kick 5\" 2x10  2x10 With green band above knees  Cueing for LE alignment   Standing gastroc s  HS stretch-long-sitting 30\"x3 ea  Incline board today   Standing hip abd, ext, march    2x10 L stance    x10 reps R stance  L stance, R active  Table-side 1.5# on RLE  R stance with L active (no weight yet); heavy UE support in R stance,    Standing knee flex  3\"2x10 L stance, R active   Seated R LE flexion 100 deg   SAQ with VMO squeeze  LAQ-AROM with VMO squeeze 5\"  5\" 2x10  2x10 2#  2#   Mini squats  2x10 Chair for cueing, attempt for 50% weight through RLE, UE support   Standing B PF  2x10 UE support; cueing for quad iso throughout   Side-stepping table-side 4 trips  Resume NV forgot today UE support, cueing for quad iso  No resist yet    bike for ROM 5'  Non-billable after set-up  Full revolutions , level 4 resistance   Alter G TM   5' amb plus set-up  Set-up from AT (See note)   AT note: leg press, HS curl            Manual Intervention      Pat mobs 4'  instructed pt for HEP   PROM R knee flex, ext 3'     STM pes, medial knee, prox med/lat shin 10'     Gentle XFM quad tendon 2'                 NMR re-education      Standing weight shifting    mod B UE support at edge of table   Gait training WBAT with 1 crutch- focusing on heel-toe gait, quad spine soft tissue/joints for the purpose of modulating pain, promoting relaxation,  increasing ROM, reducing/eliminating soft tissue swelling/inflammation/restriction, improving soft tissue extensibility and allowing for proper ROM for normal function with self care, mobility, lifting and ambulation. Modalities: PM and vasowith elevation x15'    Charges:  Timed Code Treatment Minutes: 50   Total Treatment Minutes: 70 (PM, bike)     [] EVAL (LOW) 53492 (typically 20 minutes face-to-face)  [] EVAL (MOD) 17376 (typically 30 minutes face-to-face)  [] EVAL (HIGH) 25884 (typically 45 minutes face-to-face)  [] RE-EVAL     [x] QX(97475) x  2   [] IONTO  [] NMR (08345) x      [] VASO-with stim not billed  [x] Manual (81046) x  1    [] Other:Gait x1  [] TA x       [] Mech Traction (88395)  [] ES(attended) (65766)      [x] ES (un) (29190):     GOALS: Short Term Goals: To be achieved in: 2 weeks  1. Independent in HEP and progression per patient tolerance, in order to prevent re-injury. 2. Patient will have a decrease in pain to facilitate improvement in movement, function, and ADLs as indicated by Functional Deficits. Long Term Goals: To be achieved in: 12 weeks  1. Disability index score of 20% or less for the LEFS to assist with reaching prior level of function. 2. Patient will demonstrate increased AROM to 0-125 to allow for proper joint functioning as indicated by patients Functional Deficits: squatting  3. Patient will demonstrate an increase in Strength to 5/5 good proximal hip strength and control, within 5lb HHD in LE to allow for proper functional mobility as indicated by patients Functional Deficits: ascending/descending steps with reciprocal pattern without AD. 4. Patient will return to ambulating community distances without AD without gait deviation. 5. Pt will participate in [facility] for ability to get back to full work duties (lifting/carrying/pushing/pulling, prolonged walking/standing.      Progression

## 2018-02-22 ENCOUNTER — HOSPITAL ENCOUNTER (OUTPATIENT)
Dept: PHYSICAL THERAPY | Age: 58
Discharge: HOME | End: 2018-02-23
Admitting: ORTHOPAEDIC SURGERY

## 2018-02-22 NOTE — FLOWSHEET NOTE
[facility], [address] [place]  Phone: [phone]  Fax [phone]    Physical Therapy Daily Treatment Note  Date:  2018    Patient Name:  Sharyn Barnard    :  1960  MRN: [id number]  Restrictions/Precautions:    Medical/Treatment Diagnosis Information:  Diagnosis: S82.141D (ICD-10-CM) - Displaced bicondylar fracture of right tibia, subsequent encounter for closed fracture with routine healing  Treatment Diagnosis: R knee pain M25.561, R knee stiffness M25.661, difficulty walking L53.6  Insurance/Certification information:     Physician Information:  Referring Practitioner: marta  Plan of care signed (Y/N):     Date of Patient follow up with Physician: 18    G-Code (if applicable):   CM   Date G-Code Applied:  1/15/18  PT G-Codes  Functional Assessment Tool Used: LEFS  Score: 78%  Functional Limitation: Mobility: Walking and moving around  Mobility: Walking and Moving Around Current Status (): At least 80 percent but less than 100 percent impaired, limited or restricted  Mobility: Walking and Moving Around Goal Status (): At least 20 percent but less than 40 percent impaired, limited or restricted    Progress Note: [x]  Yes  []  No  Next due by: Visit #10       Latex Allergy:  [x]NO      []YES  Preferred Language for Healthcare:   [x]English       []other:     Visit # Insurance Allowable Requires auth   11  30    [x]no        []yes:       Pain level:  0-10     SUBJECTIVE: Pt states she feels that she's putting more weight through her leg. She has just one tender spot on inner knee at her patella.     OBJECTIVE:   Observation: Gr I edema R knee, slight edema med/lat tib distal and lat to incision, incision is healing well but restriction of skin/fascia and scar noted; gait with B crutches, lacks TKE in stance phase of gait but improving quad control for amb without AD, better quad activ and HHD in LE to allow for proper functional mobility as indicated by patients Functional Deficits: ascending/descending steps with reciprocal pattern without AD. 4. Patient will return to ambulating community distances without AD without gait deviation. 5. Pt will participate in [facility] for ability to get back to full work duties (lifting/carrying/pushing/pulling, prolonged walking/standing. Progression Towards Functional goals:  [x] Patient is progressing as expected towards functional goals listed. [] Progression is slowed due to complexities listed. [] Progression has been slowed due to co-morbidities. [] Plan just implemented, too soon to assess goals progression  [] Other:     ASSESSMENT:  Pt able to janeen full WB'ing through RLE, but quad is still weak to amb without 1 crutch. Her medial knee pain improved for SAQs/LAQs and leg press with Harley taping this session. Treatment/Activity Tolerance:  [x] Patient tolerated treatment well [] Patient limited by fatique  [] Patient limited by pain  [] Patient limited by other medical complications  [] Other:     Prognosis: [x] Good [] Fair  [] Poor    Patient Requires Follow-up: [x] Yes  [] No    PLAN: Assess response to Harley taping/skin quality d/t possible adhesive allergy. Work on inc'd quad activation for R stance, continue gait training and work on progressing amb distance and weaning off of 1 crutch as quad control improves. Contin bike, pool walking at the gym. Contin Alter G in clinic for inc'd gait training.   [x] Continue per plan of care [] Alter current plan (see comments)  [] Plan of care initiated [] Hold pending MD visit [] Discharge    Electronically signed by: Karen Lizama, PT, DPT

## 2018-02-26 ENCOUNTER — HOSPITAL ENCOUNTER (OUTPATIENT)
Dept: PHYSICAL THERAPY | Age: 58
Discharge: HOME | End: 2018-02-27
Admitting: ORTHOPAEDIC SURGERY

## 2018-02-26 NOTE — FLOWSHEET NOTE
valgus                     Ecc.                         Inv. Calf Press Bhavesh. Ecc.                           Inv.              ESTRADA   Flex                  ABd    [facility] w/PT              ADd                 TKE                  Ext              Steps Up                  Up and Over                  Down                  Lateral                  Rotation              Squats  mini                     wall                    [facility]              Lunges:  Lunge to Balance                      Balance to Lunge                      Walking              Knee Extension Bilat. Ecc.                                  Inv. Hamstring Curls Bilat. 25# 2x10 25# 2x10 30# 2x10                              Ecc.                                  Inv.              Soleus Press Bilat. Ecc.                              Inv.                                      Ladders                   Square                  Jump/Hop  Low                         Med.                         High                                                                     Modality Back to PT ES/VPulse 15' ES/VPulse 15' ES/CP 15'   Initials                             SA  JLW JLW JLW   Time spent with PT assistant

## 2018-02-27 NOTE — FLOWSHEET NOTE
restriction of skin/fascia and scar noted; gait without crutch into PT day, lacks TKE in stance phase but she is able to correct with cueing. Tends to have valgus of R knee in [facility] activities (including bridging and needs cueing for alignment.)  · Test measurements: AROM R knee 0-125  RESTRICTIONS/PRECAUTIONS: s/p R tibial plateau fx ORIF DOS 88/04/83; WBAT as of last MD visit can begin to wean from AD as janeen    Exercises/Interventions:     Therapeutic Ex Sets/sec Reps Notes   Pt ed: take 1 crutch still for longer distance walking to help walk with better TKE.    3'     Quad iso 10\"   10    Knee AAROM flex/ext on SB  With strap on mat table 10\"  10\" x10  x10    Seated LE dangle (assist from LLE)    SLR-AROM 2 10  2#   SL hip abd- bobbi wall slide 2 10 2#   SL hip add 2 10 2#    HEP   Bridge  Sl bridge-RLE with CL kick 5\" 2x10  2x10 With green band above knees  Cueing for LE alignment   Standing gastroc s  HS stretch-long-sitting 30\"x3 ea  Incline board today   Standing hip abd, ext, march   On ESTRADA 2x10 R/L ea 30#   ESTRADA TKE  2x10 R 45#   Standing knee flex  3\"2x10 2# R    Seated R LE flexion 100 deg   SAQ with VMO squeeze  LAQ-AROM with VMO squeeze 5\"  5\" 2x10  x5 No med knee pain today with Harley taping, slight ant knee pain at pat tendon with LAQ so stopped after 5   Mini squats  2x10 Chair for cueing, attempt for 50% weight through RLE, UE support   Standing B PF  2x10 UE support; cueing for quad iso throughout   Side-stepping at 1/2 wall 2 trips  Red tband     bike for ROM   Non-billable after set-up  Full revolutions , level 4 resistance   Alter G TM   AT note: leg press, HS curl            Manual Intervention      Pat mobs 4'  instructed pt for HEP   PROM R knee flex, ext 3'     STM pes, medial knee, prox med/lat shin 10'     Gentle XFM quad tendon  NV add patellar tendon too 2'     Harley taping for med glide 5'  Pt states mild adhesive allergy but wants to try as she's been fine with bandages/bandaids in

## 2018-03-01 ENCOUNTER — HOSPITAL ENCOUNTER (OUTPATIENT)
Dept: PHYSICAL THERAPY | Age: 58
Discharge: HOME | End: 2018-03-31
Attending: ORTHOPAEDIC SURGERY | Admitting: ORTHOPAEDIC SURGERY

## 2018-03-01 ENCOUNTER — HOSPITAL ENCOUNTER (OUTPATIENT)
Dept: PHYSICAL THERAPY | Age: 58
Discharge: HOME | End: 2018-03-02
Admitting: ORTHOPAEDIC SURGERY

## 2018-03-01 DIAGNOSIS — R52 PAIN: Primary | ICD-10-CM

## 2018-03-01 NOTE — FLOWSHEET NOTE
[facility], [address]  Phone: [phone]  Fax [phone]    Physical Therapy Daily Treatment Note  Date:  3/1/2018    Patient Name:  Susanne Petersen    :  1960  MRN: [id number]  Restrictions/Precautions:    Medical/Treatment Diagnosis Information:  Diagnosis: S82.141D (ICD-10-CM) - Displaced bicondylar fracture of right tibia, subsequent encounter for closed fracture with routine healing  Treatment Diagnosis: R knee pain M25.561, R knee stiffness M25.661, difficulty walking W04.6  Insurance/Certification information:     Physician Information:  Referring Practitioner: marta  Plan of care signed (Y/N):     Date of Patient follow up with Physician: 18    G-Code (if applicable):   CM   Date G-Code Applied:  1/15/18  PT G-Codes  Functional Assessment Tool Used: LEFS  Score: 78%  Functional Limitation: Mobility: Walking and moving around  Mobility: Walking and Moving Around Current Status (): At least 80 percent but less than 100 percent impaired, limited or restricted  Mobility: Walking and Moving Around Goal Status (): At least 20 percent but less than 40 percent impaired, limited or restricted    Progress Note: [x]  Yes  []  No  Next due by: Visit #10       Latex Allergy:  [x]NO      []YES  Preferred Language for Healthcare:   [x]English       []other:     Visit # Insurance Allowable Requires auth   13 30    [x]no        []yes:       Pain level:  0-1/10     SUBJECTIVE: Pt had f/u with MD, who cleared her to return to work in 1 more week. She will only have a 2 day trip to [place] (1 day of lay-over/not working), then she will be on vacation x2 weeks before she starts more trips. She has been walking without her crutch the pat 2 days and his mild pain in anterior knee only intermittently.      OBJECTIVE:   · Observation: TTP patellar tendon Gr I edema R knee, slight edema med/lat tib distal taping for med glide   Pt states mild adhesive allergy but wants to try as she's been fine with bandages/bandaids in the past; instructed to monitor skin and remove if irritated/red   Patellar tendon \"Chopat\" compression strap-made with pre-wrap (did help pain so will have pt purchase one) 4'  Helped more than Harley taping   NMR re-education      Standing weight shifting    mod B UE support at edge of table   Gait training WBAT without AD focusing on heel-toe gait, quad contraction for stance phase 50'x2  Cueing for TKE, neutral rotation of LE   stair training with crutches     Also adjusted crutches for 1 notch higher as she's not needing as much UE support     Tandem balance With AT                     Therapeutic Exercise and NMR EXR  [x] (32455) Provided verbal/tactile cueing for activities related to strengthening, flexibility, endurance, ROM for improvements in LE, proximal hip, and core control with self care, mobility, lifting, ambulation.  [] (64635) Provided verbal/tactile cueing for activities related to improving balance, coordination, kinesthetic sense, posture, motor skill, proprioception  to assist with LE, proximal hip, and core control in self care, mobility, lifting, ambulation and eccentric single leg control.      NMR and Therapeutic Activities:    [x] (98290 or 87927) Provided verbal/tactile cueing for activities related to improving balance, coordination, kinesthetic sense, posture, motor skill, proprioception and motor activation to allow for proper function of core, proximal hip and LE with self care and ADLs  [x] (73891) Gait Re-education- Provided training and instruction to the patient for proper LE, core and proximal hip recruitment and positioning and eccentric body weight control with ambulation re-education including up and down stairs     Home Exercise Program:    [x] (38554) Reviewed/Progressed HEP activities related to strengthening, flexibility, endurance, ROM of core, proximal hip and LE for functional self-care, mobility, lifting and ambulation/stair navigation   [] (65248)Reviewed/Progressed HEP activities related to improving balance, coordination, kinesthetic sense, posture, motor skill, proprioception of core, proximal hip and LE for self care, mobility, lifting, and ambulation/stair navigation      Manual Treatments:  PROM / STM / Oscillations-Mobs:  G-I, II, III, IV (PA's, Inf., Post.)  [x] (99734) Provided manual therapy to mobilize LE, proximal hip and/or LS spine soft tissue/joints for the purpose of modulating pain, promoting relaxation,  increasing ROM, reducing/eliminating soft tissue swelling/inflammation/restriction, improving soft tissue extensibility and allowing for proper ROM for normal function with self care, mobility, lifting and ambulation. Modalities: PM and vasowith elevation x15'    Charges:  Timed Code Treatment Minutes: 45   Total Treatment Minutes: 60 (PM/ice)     [] EVAL (LOW) 21101 (typically 20 minutes face-to-face)  [] EVAL (MOD) 58942 (typically 30 minutes face-to-face)  [] EVAL (HIGH) 95708 (typically 45 minutes face-to-face)  [] RE-EVAL     [x] HN(37475) x  2   [] IONTO  [] NMR (94341) x      [] VASO-  [x] Manual (08855) x  1    [] Other:Gait x1  [] TA x       [] Mech Traction (73273)  [] ES(attended) (28924)      [x] ES (un) (05886):     GOALS: Short Term Goals: To be achieved in: 2 weeks  1. Independent in HEP and progression per patient tolerance, in order to prevent re-injury. 2. Patient will have a decrease in pain to facilitate improvement in movement, function, and ADLs as indicated by Functional Deficits. Long Term Goals: To be achieved in: 12 weeks  1. Disability index score of 20% or less for the LEFS to assist with reaching prior level of function. 2. Patient will demonstrate increased AROM to 0-125 to allow for proper joint functioning as indicated by patients Functional Deficits: squatting  3.  Patient will demonstrate an increase

## 2018-03-01 NOTE — FLOWSHEET NOTE
[facility] [address] [place]  Phone: [phone]  Fax [phone]      ATHLETIC TRAINING [address]  Date:  3/1/2018    Patient Name:  Louis Pink    :  1960  MRN: [id number]  Restrictions/Precautions:    Medical/Treatment Diagnosis Information:  · Diagnosis:  - Displaced bicondylar fracture of right tibia, subsequent encounter for closed fracture with routine healing  · Treatment Diagnosis: R knee pain , R knee stiffness M25.661, difficulty walking   ·   Physician Information:  Dr. Goldy Ball Post-op  8 wks  12 wks 16 wks 20 wks   24 wks                            Activity Log                                                  DOS/DOI:                                                    Date: 2/19/18 2/22/18 2/26/18 3/1/18   ATC communication Down to 1 crutch Can go without crutch, but knee is still wobbly and apt to buckle when fatigued Cues for valgus on LP Still having some patellar tendon pain. PT made prewrap Chopat strap, which helps, but no SAQ or LAQ for now   Bike       Elliptical       Treadmill Gait training 1 crutch around clinic twice AlterG shorts L  cockpit 7, 70% BW  walking at 1.2 mph for 10' for gait  gradual return to 100% BW at 0 mph, small steps in place Gait training around clinic, 2 laps, no crutch    Airdyne              Gastroc stretch       Soleus stretch       Hamstring stretch       ITB stretch       Hip Flexor stretch       Quad stretch       Adductor stretch              Weight Shifting sp                                 fp                                 tp       Lateral walking (with/w/o TB)              Balance: PEP/Rachel board                      SLS tandem R/L 3x15\" ea tandem R/L 3x15\" ea Tandem R/L 3x15\" Tandem on Airex R/L 3x15\"  R SLS 3x10\"         Star excursion load/explode             Extremity reach UE/LE              Leg Press Bhavesh.  70# 25x 70# 25x 80#

## 2018-03-03 ENCOUNTER — OFFICE VISIT (OUTPATIENT)
Dept: ORTHOPEDIC SURGERY | Age: 58
End: 2018-03-03

## 2018-03-03 VITALS
SYSTOLIC BLOOD PRESSURE: 129 MMHG | BODY MASS INDEX: 26.58 KG/M2 | HEIGHT: 63 IN | WEIGHT: 150 LBS | DIASTOLIC BLOOD PRESSURE: 84 MMHG | HEART RATE: 78 BPM

## 2018-03-03 DIAGNOSIS — Z87.81 STATUS POST OPEN REDUCTION AND INTERNAL FIXATION (ORIF) OF FRACTURE WITH NONUNION: ICD-10-CM

## 2018-03-03 DIAGNOSIS — Z98.890 STATUS POST OPEN REDUCTION AND INTERNAL FIXATION (ORIF) OF FRACTURE WITH NONUNION: ICD-10-CM

## 2018-03-03 DIAGNOSIS — S76.111A STRAIN OF RIGHT QUADRICEPS, INITIAL ENCOUNTER: ICD-10-CM

## 2018-03-03 DIAGNOSIS — S80.01XA CONTUSION OF RIGHT KNEE, INITIAL ENCOUNTER: ICD-10-CM

## 2018-03-03 DIAGNOSIS — M25.561 ACUTE PAIN OF RIGHT KNEE: Primary | ICD-10-CM

## 2018-03-03 PROCEDURE — 99213 OFFICE O/P EST LOW 20 MIN: CPT | Performed by: NURSE PRACTITIONER

## 2018-03-03 PROCEDURE — L1812 KO ELASTIC W/JOINTS PRE OTS: HCPCS | Performed by: NURSE PRACTITIONER

## 2018-03-03 NOTE — PROGRESS NOTES
have  pain on motion  -there is an effusion  - there is tenderness over the  medial, lateral region. Medial joint line tenderness  -there are not any masses  - there is not ligamentous instability  -there is not  deformity noted. - tenderness laxity is not noted with varus or valgus stress.   -Mc Murrays testing negative  -Anterior/posterior drawer testing negative    Contralateral Exam:  -No obvious deformities  -No abrasions or cellulitis noted, NVI   -Full ROM   -No joint laxity  -no palpable tenderness noted    Neurological exam:   -Deep tendon reflexes are 2/4 at the knees and 2/4 at the ankles with strong extensor hallicus longus motor strength bilaterally. --Distal pulses DP/PT: R. 2+; L. 2+.     Skin exam:  There is not any cellulitis, lymphedema or cutaneous lesions noted in the lower extremities. Xray:  3 view (AP/sunrise/lateral) of right knee show:  No acute fractures or deformities; joint space well maintained. Plates and screws well placed, no loosening noted. Assessment:  S/p right tibial plateau ORIF; right knee contusion; right quadriceps strain    Plan:  The patient was placed in an economy hinged knee brace for stability. She will continue to do physical therapy and has an appointment scheduled for Monday. She will take over-the-counter anti-inflammatories as needed. She will rest as much as possible this weekend as well as ice and elevate her knee. She will follow up with Dr. Reyes Henriquez or his PA before she goes back to work on March 13. Procedures    Breg Economy Hinged Knee WrapAround Brace     Patient was prescribed a [employer] Economy Hinged Wrap Around Knee Brace. The right knee will require stabilization / immobilization from this semi-rigid / rigid orthosis to improve their function. The orthosis will assist in protecting the affected area, provide functional support and facilitate healing.     The patient was educated and fit by a healthcare professional with expert knowledge and specialization in brace application while under the direct supervision of the treating physician. Verbal and written instructions for the use of and application of this item were provided. They were instructed to contact the office immediately should the brace result in increased pain, decreased sensation, increased swelling or worsening of the condition.

## 2018-03-05 ENCOUNTER — HOSPITAL ENCOUNTER (OUTPATIENT)
Dept: PHYSICAL THERAPY | Age: 58
Discharge: HOME | End: 2018-03-06
Admitting: ORTHOPAEDIC SURGERY

## 2018-03-05 NOTE — FLOWSHEET NOTE
tband at ankles   Step-ups  LSU      bike for ROM   Non-billable after set-up  Full revolutions , level 4 resistance   Alter G TM   AT note: leg press, HS curl, ESTRADA, TKEs            Manual Intervention      Pat mobs: sup/inf,  2'  instructed pt for HEP   PROM R knee flex ext 4'  Gentle, no inc'd overpressure through pain   Gentle effleurage and STM pes, medial knee, lat knee, prox gastroc and popliteal fossa, quad, prox med/lat shin 20'     Gentle XFM quad tendon and patellar tendon       Harley taping for med glide   Pt states mild adhesive allergy but wants to try as she's been fine with bandages/bandaids in the past; instructed to monitor skin and remove if irritated/red   Patellar tendon \"Chopat\" compression strap-made with pre-wrap (did help pain so will have pt purchase one)   Helped more than Harley taping   NMR re-education      Standing weight shifting    mod B UE support at edge of table   Gait training WBAT without AD focusing on heel-toe gait, quad contraction for stance phase  Cueing for TKE, neutral rotation of LE   stair training with crutches     Also adjusted crutches for 1 notch higher as she's not needing as much UE support     Tandem balance With AT                     Therapeutic Exercise and NMR EXR  [x] (08091) Provided verbal/tactile cueing for activities related to strengthening, flexibility, endurance, ROM for improvements in LE, proximal hip, and core control with self care, mobility, lifting, ambulation.  [] (93678) Provided verbal/tactile cueing for activities related to improving balance, coordination, kinesthetic sense, posture, motor skill, proprioception  to assist with LE, proximal hip, and core control in self care, mobility, lifting, ambulation and eccentric single leg control.      NMR and Therapeutic Activities:    [] (08641 or 19948) Provided verbal/tactile cueing for activities related to improving balance, coordination, kinesthetic sense, posture, motor skill,

## 2018-03-08 ENCOUNTER — HOSPITAL ENCOUNTER (OUTPATIENT)
Dept: PHYSICAL THERAPY | Age: 58
Discharge: HOME | End: 2018-03-09
Admitting: ORTHOPAEDIC SURGERY

## 2018-03-08 NOTE — FLOWSHEET NOTE
Provided verbal/tactile cueing for activities related to improving balance, coordination, kinesthetic sense, posture, motor skill, proprioception and motor activation to allow for proper function of core, proximal hip and LE with self care and ADLs  [] (75874) Gait Re-education- Provided training and instruction to the patient for proper LE, core and proximal hip recruitment and positioning and eccentric body weight control with ambulation re-education including up and down stairs     Home Exercise Program:    [x] (48032) Reviewed/Progressed HEP activities related to strengthening, flexibility, endurance, ROM of core, proximal hip and LE for functional self-care, mobility, lifting and ambulation/stair navigation   [] (69176)Reviewed/Progressed HEP activities related to improving balance, coordination, kinesthetic sense, posture, motor skill, proprioception of core, proximal hip and LE for self care, mobility, lifting, and ambulation/stair navigation      Manual Treatments:  PROM / STM / Oscillations-Mobs:  G-I, II, III, IV (PA's, Inf., Post.)  [x] (68651) Provided manual therapy to mobilize LE, proximal hip and/or LS spine soft tissue/joints for the purpose of modulating pain, promoting relaxation,  increasing ROM, reducing/eliminating soft tissue swelling/inflammation/restriction, improving soft tissue extensibility and allowing for proper ROM for normal function with self care, mobility, lifting and ambulation.      Modalities: HV and vaso boot to RLE with elevation x20'    Charges:  Timed Code Treatment Minutes: 55   Total Treatment Minutes: 75 (HV, vaso)     [] EVAL (LOW) 95688 (typically 20 minutes face-to-face)  [] EVAL (MOD) 21432 (typically 30 minutes face-to-face)  [] EVAL (HIGH) 72105 (typically 45 minutes face-to-face)  [] RE-EVAL     [x] BQ(91570) x  2   [] IONTO  [] NMR (61874) x      [] VASO-with stim  [x] Manual (80118) x  2    [] Other:Gait x1  [] TA x       [] Mech Traction (88068)  [] ES(attended) Patient limited by other medical complications  [] Other:     Prognosis: [x] Good [] Fair  [] Poor    Patient Requires Follow-up: [x] Yes  [] No    PLAN: Assess pain, edema, ROM and janeen for WB'ing. Pt to use brace and SW or B crutches for now, continue to ice/elevate. Consider MCL/med meniscus injury possibility (no new MRI but xrays -) and quad/HS and calf strain since her fall.   [x] Continue per plan of care [] Alter current plan (see comments)  [] Plan of care initiated [] Hold pending MD visit [] Discharge    Electronically signed by: Kadie Eagle, PT, DPT

## 2018-03-12 ENCOUNTER — HOSPITAL ENCOUNTER (OUTPATIENT)
Dept: PHYSICAL THERAPY | Age: 58
Discharge: HOME | End: 2018-03-13
Admitting: ORTHOPAEDIC SURGERY

## 2018-03-12 NOTE — FLOWSHEET NOTE
soreness but can assess if better NV   Mini squats  Standing B PF  2x10 UE support; cueing for quad iso throughout   Side-stepping   Red tband at ankles   Step-ups  LSU      bike for ROM 10'  Non-billable after set-up  Full revolutions   Alter G TM   AT note: leg press, HS curl, ESTRADA, TKEs Hold            Manual Intervention      Pat mobs: sup/inf,  2'  instructed pt for HEP   PROM R knee flex ext   Gentle, no inc'd overpressure through pain   Gentle effleurage and STM pes, medial knee, lat knee, prox gastroc and popliteal fossa, biceps femoris, quad, prox med/lat shin 20'     Gentle XFM quad tendon and patellar tendon       Harley taping for med glide   Pt states mild adhesive allergy but wants to try as she's been fine with bandages/bandaids in the past; instructed to monitor skin and remove if irritated/red   Patellar tendon \"Chopat\" compression strap-made with pre-wrap (did help pain so will have pt purchase one)   Helped more than Harley taping   NMR re-education      Standing weight shifting    mod B UE support at edge of table   Gait training WBAT without AD focusing on heel-toe gait, quad contraction for stance phase  Cueing for TKE, neutral rotation of LE   stair training with crutches     Also adjusted crutches for 1 notch higher as she's not needing as much UE support     Tandem balance                      Therapeutic Exercise and NMR EXR  [x] (30076) Provided verbal/tactile cueing for activities related to strengthening, flexibility, endurance, ROM for improvements in LE, proximal hip, and core control with self care, mobility, lifting, ambulation.  [] (80604) Provided verbal/tactile cueing for activities related to improving balance, coordination, kinesthetic sense, posture, motor skill, proprioception  to assist with LE, proximal hip, and core control in self care, mobility, lifting, ambulation and eccentric single leg control.      NMR and Therapeutic Activities:    [] (83618 or 47045) Provided verbal/tactile cueing for activities related to improving balance, coordination, kinesthetic sense, posture, motor skill, proprioception and motor activation to allow for proper function of core, proximal hip and LE with self care and ADLs  [] (49241) Gait Re-education- Provided training and instruction to the patient for proper LE, core and proximal hip recruitment and positioning and eccentric body weight control with ambulation re-education including up and down stairs     Home Exercise Program:    [x] (90968) Reviewed/Progressed HEP activities related to strengthening, flexibility, endurance, ROM of core, proximal hip and LE for functional self-care, mobility, lifting and ambulation/stair navigation   [] (99761)Reviewed/Progressed HEP activities related to improving balance, coordination, kinesthetic sense, posture, motor skill, proprioception of core, proximal hip and LE for self care, mobility, lifting, and ambulation/stair navigation      Manual Treatments:  PROM / STM / Oscillations-Mobs:  G-I, II, III, IV (PA's, Inf., Post.)  [x] (50230) Provided manual therapy to mobilize LE, proximal hip and/or LS spine soft tissue/joints for the purpose of modulating pain, promoting relaxation,  increasing ROM, reducing/eliminating soft tissue swelling/inflammation/restriction, improving soft tissue extensibility and allowing for proper ROM for normal function with self care, mobility, lifting and ambulation.      Modalities: HV and vaso to knee x15'    Charges:  Timed Code Treatment Minutes: 50   Total Treatment Minutes: 75 (bike, HV, vaso)     [] EVAL (LOW) 41954 (typically 20 minutes face-to-face)  [] EVAL (MOD) 48443 (typically 30 minutes face-to-face)  [] EVAL (HIGH) 46100 (typically 45 minutes face-to-face)  [] RE-EVAL     [x] EB(20815) x  2   [] IONTO  [] NMR (59326) x      [] VASO-with stim  [x] Manual (10831) x  1    [] Other:Gait x1  [] TA x       [] Mech Traction (82880)  [] ES(attended) (01066)      [x] ES No    PLAN: Assess pain, edema, ROM and janeen for WB'ing. Pt to use brace and SW or B crutches for now, continue to ice/elevate. Consider MCL/med meniscus injury possibility (no new MRI but xrays -) and quad/HS and calf strain since her fall.   [x] Continue per plan of care [] Alter current plan (see comments)  [] Plan of care initiated [] Hold pending MD visit [] Discharge    Electronically signed by: Hernán Sibley, PT, DPT

## 2018-03-14 ENCOUNTER — HOSPITAL ENCOUNTER (OUTPATIENT)
Dept: PHYSICAL THERAPY | Age: 58
Discharge: HOME | End: 2018-03-15
Admitting: ORTHOPAEDIC SURGERY

## 2018-03-14 NOTE — FLOWSHEET NOTE
leaves for Alejandra. OBJECTIVE:   · Observation: TTP quad tendon and proximal to mid quad, very TTP medial joint line, slightly less at lateral joint line, improved proximal lat calf, improved mid to distal biceps femoris less at patellar tendon. Gr II edema R knee, gait with 1 crutch and med/lat patellar stabilizer entering PT today, ambulating with foot-flat and flexed knee. · Test measurements: AAROM R knee 0-123,   ·   RESTRICTIONS/PRECAUTIONS: s/p R tibial plateau fx ORIF DOS 83/15/30; WBAT now and ok to return to work starting 3/12/18; fall on 3/318-xrays - for fx but concern for MCL/med meniscus injury now    Exercises/Interventions:     Therapeutic Ex Sets/sec Reps Notes   Pt ed: use brace and B crutches or SW for reduced pain/WB'ing, ice/elevate, no HEP except gentle flex/ext AAROM. Schedule f/u with MD. Discussed/prepared pt for fact that she likely cannot go back to work next week. Pt is very emotional about this and frustrated at her set-back. Asked pt to consider 1 more month off work to give herself time to heal from re-injury. Pt will be in touch with her  at work and will have MD write note for doc proof for time still away from work.      Ankle pump     Quad iso  Prone HS curl  Prone TKE  Prone hip ext 10\"  2  5\"  210  10  20  10    Knee AAROM flex/ext on SB  Seated HS with glider  With strap on mat table 5\"    5\" x15    x10    Seated LE dangle (assist from LLE, then overpressure lightly from LLE    SLR-AROM 2 10    SL hip abd- bobbi wall slide   2 10    SL hip add 2 10        Bridge  Sl bridge-RLE with CL LE flat on table 5\" 2x10  x10 With green band above knees  Cueing for LE alignment   Wall sits to ~50 deg with VMO squeeze Mirror for LE alignment   gastroc s-on incline board  HS stretch-long-sitting 30\"x3 ea   Standing hip abd, ext, march   2x10 R/L ea   standing TKE 2x10 RGreen tband   Standing knee flex 3\"x10   Seated R LE flexion 100 deg   SAQ with VMO squeeze  LAQ-AROM Provided verbal/tactile cueing for activities related to improving balance, coordination, kinesthetic sense, posture, motor skill, proprioception and motor activation to allow for proper function of core, proximal hip and LE with self care and ADLs  [x] (98641) Gait Re-education- Provided training and instruction to the patient for proper LE, core and proximal hip recruitment and positioning and eccentric body weight control with ambulation re-education including up and down stairs     Home Exercise Program:    [x] (42033) Reviewed/Progressed HEP activities related to strengthening, flexibility, endurance, ROM of core, proximal hip and LE for functional self-care, mobility, lifting and ambulation/stair navigation   [] (50236)Reviewed/Progressed HEP activities related to improving balance, coordination, kinesthetic sense, posture, motor skill, proprioception of core, proximal hip and LE for self care, mobility, lifting, and ambulation/stair navigation      Manual Treatments:  PROM / STM / Oscillations-Mobs:  G-I, II, III, IV (PA's, Inf., Post.)  [x] (71762) Provided manual therapy to mobilize LE, proximal hip and/or LS spine soft tissue/joints for the purpose of modulating pain, promoting relaxation,  increasing ROM, reducing/eliminating soft tissue swelling/inflammation/restriction, improving soft tissue extensibility and allowing for proper ROM for normal function with self care, mobility, lifting and ambulation.      Modalities:declined today, feels ok    Charges:  Timed Code Treatment Minutes: 58   Total Treatment Minutes: 68 (bike)     [] EVAL (LOW) 28121 (typically 20 minutes face-to-face)  [] EVAL (MOD) 27492 (typically 30 minutes face-to-face)  [] EVAL (HIGH) 20739 (typically 45 minutes face-to-face)  [] RE-EVAL     [x] VL(25695) x  2   [] IONTO  [] NMR (20168) x      [] VASO-with stim  [x] Manual (17137) x  1    [x] Other:Gait x1  [] TA x       [] Mech Traction (07098)  [] ES(attended) (00657)      [] ES (un)

## 2018-03-16 ENCOUNTER — HOSPITAL ENCOUNTER (OUTPATIENT)
Dept: PHYSICAL THERAPY | Age: 58
Discharge: HOME | End: 2018-03-17
Admitting: ORTHOPAEDIC SURGERY

## 2018-03-16 NOTE — FLOWSHEET NOTE
joint line, improved proximal lat calf, improved mid to distal biceps femoris less at patellar tendon. Gr II edema R knee, gait with 1 crutch and  entering PT today, ambulating with foot-flat and flexed knee, but able to amb c heel-toe gait after practice   · Test measurements: AAROM R knee 0-130,   ·   RESTRICTIONS/PRECAUTIONS: s/p R tibial plateau fx ORIF DOS 45/60/78; WBAT now and ok to return to work starting 3/12/18; fall on 3/318-xrays - for fx but concern for MCL/med meniscus injury now    Exercises/Interventions:     Therapeutic Ex Sets/sec Reps Notes   Pt ed: use brace and B crutches or SW for reduced pain/WB'ing, ice/elevate, no HEP except gentle flex/ext AAROM. Schedule f/u with MD. Discussed/prepared pt for fact that she likely cannot go back to work next week. Pt is very emotional about this and frustrated at her set-back. Asked pt to consider 1 more month off work to give herself time to heal from re-injury. Pt will be in touch with her  at work and will have MD write note for doc proof for time still away from work.      Ankle pump     Quad iso  Prone HS curl  Prone TKE  Prone hip ext 2  10\"  210  10  10  10    Knee AAROM flex/ext on SB  Seated HS with glider  With strap on mat table 5\"    5\" x15    x10    Seated LE dangle (assist from LLE, then overpressure lightly from LLE    SLR-AROM 3 10    SL hip abd- bobbi wall slide   3 10    SL hip add 3 10        Bridge  Sl bridge-RLE with CL LE flat on table 5\" 2x10  x10 With green band above knees  Cueing for LE alignment   Wall sits to ~50 deg with VMO squeeze Mirror for LE alignment   gastroc s-on incline board  HS stretch-long-sitting 30\"x3 ea   Standing hip abd, ext, march   2x10 R/L eaNo UE support on LLE and min UE support on RLE   standing TKE 10\"x25 RGreen tband   Standing knee flex    Seated R LE flexion 100 deg   SAQ with VMO squeeze  LAQ-AROM with VMO squeeze [id number] unable to complete LAQ d/t pain   Mini squats  UE verbal/tactile cueing for activities related to improving balance, coordination, kinesthetic sense, posture, motor skill, proprioception and motor activation to allow for proper function of core, proximal hip and LE with self care and ADLs  [x] (96848) Gait Re-education- Provided training and instruction to the patient for proper LE, core and proximal hip recruitment and positioning and eccentric body weight control with ambulation re-education including up and down stairs     Home Exercise Program:    [x] (80106) Reviewed/Progressed HEP activities related to strengthening, flexibility, endurance, ROM of core, proximal hip and LE for functional self-care, mobility, lifting and ambulation/stair navigation   [] (18966)Reviewed/Progressed HEP activities related to improving balance, coordination, kinesthetic sense, posture, motor skill, proprioception of core, proximal hip and LE for self care, mobility, lifting, and ambulation/stair navigation      Manual Treatments:  PROM / STM / Oscillations-Mobs:  G-I, II, III, IV (PA's, Inf., Post.)  [x] (08422) Provided manual therapy to mobilize LE, proximal hip and/or LS spine soft tissue/joints for the purpose of modulating pain, promoting relaxation,  increasing ROM, reducing/eliminating soft tissue swelling/inflammation/restriction, improving soft tissue extensibility and allowing for proper ROM for normal function with self care, mobility, lifting and ambulation.      Modalities: HV and vaso to knee x15'     Charges:  Timed Code Treatment Minutes: 40   Total Treatment Minutes: 65(bike, ES/VASO)     [] EVAL (LOW) 30568 (typically 20 minutes face-to-face)  [] EVAL (MOD) 18886 (typically 30 minutes face-to-face)  [] EVAL (HIGH) 32977 (typically 45 minutes face-to-face)  [] RE-EVAL     [x] (76218) x  2   [] IONTO  [] NMR (66165) x      [x] VASO-with stim   [x] Manual (46213) x  1    [x] Other:Gait  [] TA x       [] Mech Traction (19909)  [] ES(attended) (48185)      [] ES (un) (17071):     GOALS: Short Term Goals: To be achieved in: 2 weeks  1. Independent in HEP and progression per patient tolerance, in order to prevent re-injury. 2. Patient will have a decrease in pain to facilitate improvement in movement, function, and ADLs as indicated by Functional Deficits. Long Term Goals: To be achieved in: 12 weeks  1. Disability index score of 20% or less for the LEFS to assist with reaching prior level of function. 2. Patient will demonstrate increased AROM to 0-125 to allow for proper joint functioning as indicated by patients Functional Deficits: squatting  3. Patient will demonstrate an increase in Strength to 5/5 good proximal hip strength and control, within 5lb HHD in LE to allow for proper functional mobility as indicated by patients Functional Deficits: ascending/descending steps with reciprocal pattern without AD. 4. Patient will return to ambulating community distances without AD without gait deviation. 5. Pt will participate in [address] [place] [address] for ability to get back to full work duties (lifting/carrying/pushing/pulling, prolonged walking/standing. Progression Towards Functional goals:  [] Patient is progressing as expected towards functional goals listed. [x] Progression is slowed due to complexities listed. [] Progression has been slowed due to co-morbidities. [] Plan just implemented, too soon to assess goals progression  [] Other:     ASSESSMENT:  Pt continues to have pain reductions each visit. Her knee ROM did improve this visit without passive techniques, so did not complete PROM. Pt felt well enough to ambulate without a crutch and her brace. However, she did have an instance in which she lost her balance while just walking straight. Therefore,  she should use brace for all comm amb and crutch for all amb on uneven ground/when she's travelling this week to [place].  Will progress strength as tolerated next visit and see pt when back in [facility] to assess her mobility and get

## 2018-03-28 ENCOUNTER — HOSPITAL ENCOUNTER (OUTPATIENT)
Dept: PHYSICAL THERAPY | Age: 58
Discharge: HOME | End: 2018-03-29
Admitting: ORTHOPAEDIC SURGERY

## 2018-03-28 NOTE — FLOWSHEET NOTE
Side-stepping   NV? Red tband at ankles   Step-ups  LSU 4\" 2x10     bike for ROM 5'  Non-billable after set-up  Full revolutions   Alter G TM   PTT note: leg press,step-up            Manual Intervention      Pat mobs: sup/inf,  2'  instructed pt for HEP   PROM R knee flex  DND today as pt progressed on own  Gentle, no inc'd overpressure through pain   STM pes, medial knee, lat knee, prox gastroc and popliteal fossa, biceps femoris, quad, prox med/lat shin 15'     Gentle XFM quad tendon and patellar tendon                   NMR re-education      Standing weight shifting    mod B UE support at edge of table   Gait training WBAT with 1 crutch/no brace  Without AD focusing on heel-toe gait, quad contraction for stance phase     400' Cueing for TKE, neutral rotation of LE   stair training with crutches: step-to pattern  Reciprocal pattern          Tandem balance on floor  On airex   Resume NV? Therapeutic Exercise and NMR EXR  [x] (75724) Provided verbal/tactile cueing for activities related to strengthening, flexibility, endurance, ROM for improvements in LE, proximal hip, and core control with self care, mobility, lifting, ambulation.  [] (92857) Provided verbal/tactile cueing for activities related to improving balance, coordination, kinesthetic sense, posture, motor skill, proprioception  to assist with LE, proximal hip, and core control in self care, mobility, lifting, ambulation and eccentric single leg control.      NMR and Therapeutic Activities:    [] (17137 or 60674) Provided verbal/tactile cueing for activities related to improving balance, coordination, kinesthetic sense, posture, motor skill, proprioception and motor activation to allow for proper function of core, proximal hip and LE with self care and ADLs  [x] (60587) Gait Re-education- Provided training and instruction to the patient for proper LE, core and proximal hip recruitment and positioning and eccentric body weight control with prior level of function. 2. Patient will demonstrate increased AROM to 0-125 to allow for proper joint functioning as indicated by patients Functional Deficits: squatting  3. Patient will demonstrate an increase in Strength to 5/5 good proximal hip strength and control, within 5lb HHD in LE to allow for proper functional mobility as indicated by patients Functional Deficits: ascending/descending steps with reciprocal pattern without AD. 4. Patient will return to ambulating community distances without AD without gait deviation. 5. Pt will participate in [facility] for ability to get back to full work duties (lifting/carrying/pushing/pulling, prolonged walking/standing. Progression Towards Functional goals:  [] Patient is progressing as expected towards functional goals listed. [x] Progression is slowed due to complexities listed. [] Progression has been slowed due to co-morbidities. [] Plan just implemented, too soon to assess goals progression  [] Other:     ASSESSMENT:  Pt's ROM and pain are improving, she continues to have most c/o pain at medial knee. Still suspicious for MCL strain and possible medial meniscus injury from her fall, but pt is tolerating PT for continued conservative tx. As she is back to work now and can't wear brace, discussed use of lower profile stability via copper fit brace, ace wrap or taping techniques, and to avoid pivoting on RLE. She can also consider fold-up cane to use when not on work duties to help with walking distances in airport. Will continue to work on inc'd strength and improved gait for 2-3 weeks, unless pain worsens/pt can't perform job duties--would then have her R/S with Dr. Eulalia Myrick.     Treatment/Activity Tolerance:  [x] Patient tolerated treatment well [] Patient limited by fatique  [] Patient limited by pain  [] Patient limited by other medical complications  [] Other:     Prognosis: [x] Good [] Fair  [] Poor    Patient Requires Follow-up: [x] Yes  []

## 2018-03-28 NOTE — FLOWSHEET NOTE
Leg Press Bhavesh. 70# 25x 70# 25x 80# 2x10 cues for valgus 80# 2x10 cues for valgus 70# 2x10                     Ecc.                          Inv. Calf Press Bhavesh. Ecc.                            Inv.                ESTRADA   Flex    30# R/L 2x10               ABd   [facility] Ecru w/PT 30# R/L 2x10               ADd                  TKE    45# 20x5\"               Ext    30# R/L 2x10            Steps Up    FSU/LSU w/PT FSU 4\" 2x10              Up and Over                   Down                   Lateral                   Rotation                Squats  mini                      wall                     [facility]                Lunges:  Lunge to Balance                       Balance to Lunge                       Walking                Knee Extension Bilat. Ecc.                                   Inv. Hamstring Curls Bilat. 25# 2x10 25# 2x10 30# 2x10 30# 2x10                               Ecc.                                   Inv.                Soleus Press Bilat. Ecc.                               Inv.                                         Ladders                    Square                   Jump/Hop  Low                          Med.                          High                                                                        Modality ES/VPulse 15' ES/VPulse 15' ES/CP 15' ES/CP 15' ES/VPulse 15'   Initials                             JLW JLW JLW JLW KRT   Time spent with PT assistant     6'

## 2018-04-01 ENCOUNTER — HOSPITAL ENCOUNTER (OUTPATIENT)
Dept: PHYSICAL THERAPY | Age: 58
Discharge: HOME | End: 2018-04-30
Attending: ORTHOPAEDIC SURGERY | Admitting: ORTHOPAEDIC SURGERY

## 2018-04-02 ENCOUNTER — HOSPITAL ENCOUNTER (OUTPATIENT)
Dept: PHYSICAL THERAPY | Age: 58
Discharge: HOME | End: 2018-04-03
Admitting: ORTHOPAEDIC SURGERY

## 2018-04-04 ENCOUNTER — HOSPITAL ENCOUNTER (OUTPATIENT)
Dept: PHYSICAL THERAPY | Age: 58
Discharge: HOME | End: 2018-04-05
Admitting: ORTHOPAEDIC SURGERY

## 2018-04-10 ENCOUNTER — HOSPITAL ENCOUNTER (OUTPATIENT)
Dept: PHYSICAL THERAPY | Age: 58
Discharge: HOME | End: 2018-04-11
Admitting: ORTHOPAEDIC SURGERY

## 2018-04-17 ENCOUNTER — HOSPITAL ENCOUNTER (OUTPATIENT)
Dept: PHYSICAL THERAPY | Age: 58
Discharge: HOME | End: 2018-04-18
Admitting: ORTHOPAEDIC SURGERY

## 2018-04-26 DIAGNOSIS — R52 PAIN: Primary | ICD-10-CM

## 2018-05-01 ENCOUNTER — HOSPITAL ENCOUNTER (OUTPATIENT)
Dept: PHYSICAL THERAPY | Age: 58
Discharge: HOME | End: 2018-05-31
Attending: ORTHOPAEDIC SURGERY | Admitting: ORTHOPAEDIC SURGERY

## 2018-05-09 ENCOUNTER — HOSPITAL ENCOUNTER (OUTPATIENT)
Dept: PHYSICAL THERAPY | Age: 58
Discharge: HOME | End: 2018-05-10
Admitting: ORTHOPAEDIC SURGERY

## 2018-05-15 ENCOUNTER — HOSPITAL ENCOUNTER (OUTPATIENT)
Dept: PHYSICAL THERAPY | Age: 58
Discharge: HOME | End: 2018-05-16
Admitting: ORTHOPAEDIC SURGERY

## 2018-06-01 ENCOUNTER — HOSPITAL ENCOUNTER (OUTPATIENT)
Dept: PHYSICAL THERAPY | Age: 58
Discharge: HOME | End: 2018-06-30
Attending: ORTHOPAEDIC SURGERY | Admitting: ORTHOPAEDIC SURGERY

## 2018-08-16 DIAGNOSIS — M25.561 RIGHT KNEE PAIN, UNSPECIFIED CHRONICITY: Primary | ICD-10-CM

## 2018-09-06 ENCOUNTER — HOSPITAL ENCOUNTER (OUTPATIENT)
Dept: PHYSICAL THERAPY | Age: 58
Setting detail: THERAPIES SERIES
Discharge: HOME | End: 2018-09-06
Payer: COMMERCIAL

## 2018-09-06 PROCEDURE — 97161 PT EVAL LOW COMPLEX 20 MIN: CPT

## 2018-09-06 PROCEDURE — G8978 MOBILITY CURRENT STATUS: HCPCS

## 2018-09-06 PROCEDURE — G8979 MOBILITY GOAL STATUS: HCPCS

## 2018-09-06 PROCEDURE — 97140 MANUAL THERAPY 1/> REGIONS: CPT

## 2018-09-06 PROCEDURE — G0283 ELEC STIM OTHER THAN WOUND: HCPCS

## 2018-09-06 PROCEDURE — 97110 THERAPEUTIC EXERCISES: CPT

## 2018-09-06 NOTE — PLAN OF CARE
[x] a total of 4 or more elements   [x] A clinical presentation with:  [x] stable and/or uncomplicated characteristics   [] evolving clinical presentation with changing characteristics  [] unstable and unpredictable characteristics;   [x] Clinical decision making of [x] low, [] moderate, [] high complexity using standardized patient assessment instrument and/or measurable assessment of functional outcome. [x] EVAL (LOW) 67326 (typically 20 minutes face-to-face)  [] EVAL (MOD) 43837 (typically 30 minutes face-to-face)  [] EVAL (HIGH) 84286 (typically 45 minutes face-to-face)  [] RE-EVAL       PLAN:   Frequency/Duration:  2 days per week for 8 Weeks:  Interventions:  [x]  Therapeutic exercise including: strength training, ROM, for Lower extremity and core   [x]  NMR activation and proprioception for LE, Glutes and Core   [x]  Manual therapy as indicated for LE, Hip and spine to include: Dry Needling/IASTM, STM, PROM, Gr I-IV mobilizations, manipulation. [x] Modalities as needed that may include: thermal agents, E-stim, Biofeedback, US, iontophoresis as indicated  [x] Patient education on joint protection, postural re-education, activity modification, progression of HEP. HEP instruction: (see scanned forms)    GOALS:  Patient stated goal: Pt would like to be able to walk normally without pain. Therapist goals for Patient:   Short Term Goals: To be achieved in: 2 weeks  1. Independent in HEP and progression per patient tolerance, in order to prevent re-injury. 2. Patient will have a decrease in pain to facilitate improvement in movement, function, and ADLs as indicated by Functional Deficits. Long Term Goals: To be achieved in: 8 weeks  1. Disability index score of 35% or less for the LEFS to assist with reaching prior level of function. 2. Patient will demonstrate increased AROM to 0-145 deg at the knee to allow for normal squatting and kneeling.   3. Patient will demonstrate an increase in Strength

## 2018-09-06 NOTE — FLOWSHEET NOTE
and/or LS spine soft tissue/joints for the purpose of modulating pain, promoting relaxation,  increasing ROM, reducing/eliminating soft tissue swelling/inflammation/restriction, improving soft tissue extensibility and allowing for proper ROM for normal function with self care, mobility, lifting and ambulation. Modalities:  PM + CP x 15' R knee    Charges:  Timed Code Treatment Minutes: 25   Total Treatment Minutes: 75 (eval, ES)     [x] EVAL (LOW) 90351 (typically 20 minutes face-to-face)  [] EVAL (MOD) 45787 (typically 30 minutes face-to-face)  [] EVAL (HIGH) 83508 (typically 45 minutes face-to-face)  [] RE-EVAL     [x] VK(10973) x  1   [] IONTO  [] NMR (31050) x      [] VASO  [x] Manual (24753) x  1    [] Other:  [] TA x       [] Mech Traction (80162)  [] ES(attended) (34698)      [x] ES (un) (80954):     GOALS: Patient stated goal: Pt would like to be able to walk normally without pain.      Therapist goals for Patient:   Short Term Goals: To be achieved in: 2 weeks  1. Independent in HEP and progression per patient tolerance, in order to prevent re-injury. 2. Patient will have a decrease in pain to facilitate improvement in movement, function, and ADLs as indicated by Functional Deficits.     Long Term Goals: To be achieved in: 8 weeks  1. Disability index score of 35% or less for the LEFS to assist with reaching prior level of function. 2. Patient will demonstrate increased AROM to 0-145 deg at the knee to allow for normal squatting and kneeling. 3. Patient will demonstrate an increase in Strength to 5/5 for the right knee and at least 4+/5 for the right hip. 4. Patient will be able to squat to 100 deg without increased knee pain and minimal UE support to reverse position. 5. Pt will be able to walk 15-20' without a limp or pain for work duties. Progression Towards Functional goals:  [] Patient is progressing as expected towards functional goals listed.     [] Progression is slowed due to complexities listed. [] Progression has been slowed due to co-morbidities.   [x] Plan just implemented, too soon to assess goals progression  [] Other:     ASSESSMENT:  See eval    Treatment/Activity Tolerance:  [x] Patient tolerated treatment well [] Patient limited by fatique  [] Patient limited by pain  [] Patient limited by other medical complications  [] Other:     Prognosis: [x] Good [] Fair  [] Poor    Patient Requires Follow-up: [x] Yes  [] No    PLAN: See eval  [] Continue per plan of care [] Alter current plan (see comments)  [x] Plan of care initiated [] Hold pending MD visit [] Discharge    Electronically signed by: Lucita Garcia, PT, DPT

## 2018-09-11 ENCOUNTER — HOSPITAL ENCOUNTER (OUTPATIENT)
Dept: PHYSICAL THERAPY | Age: 58
Setting detail: THERAPIES SERIES
Discharge: HOME | End: 2018-09-11
Payer: COMMERCIAL

## 2018-09-11 PROCEDURE — 97140 MANUAL THERAPY 1/> REGIONS: CPT

## 2018-09-11 PROCEDURE — G0283 ELEC STIM OTHER THAN WOUND: HCPCS

## 2018-09-11 PROCEDURE — 97110 THERAPEUTIC EXERCISES: CPT

## 2018-09-11 NOTE — FLOWSHEET NOTE
[facility], [address]  Phone: [phone]  Fax [phone]    Physical Therapy Daily Treatment Note  Date:  2018    Patient Name:  Angela Hughes    :  1960  MRN: [id number]  Restrictions/Precautions:    Medical/Treatment Diagnosis Information:  · Diagnosis: S82.141D Fracture of tibial plateau, right, closed, with routine healing, subsequent encounter  · Treatment Diagnosis: MM25.561 Pain in right knee, M25.661 Stiffness in right knee, R26.2 Difficulty in walking  Insurance/Certification information:  PT Insurance Information: [employer]  Physician Information:  Referring Practitioner: Dr. aJce Barry of care signed (Y/N):     Date of Patient follow up with Physician:     G-Code (if applicable):      Date G-Code Applied:  18  PT G-Codes  Functional Assessment Tool Used: LEFS  Score: 54  Functional Limitation: Mobility: Walking and moving around  Mobility: Walking and Moving Around Current Status (): At least 40 percent but less than 60 percent impaired, limited or restricted  Mobility: Walking and Moving Around Goal Status (): At least 20 percent but less than 40 percent impaired, limited or restricted    Progress Note: [x]  Yes  []  No  Next due by: Visit #10       Latex Allergy:  [x]NO      []YES  Preferred Language for Healthcare:   [x]English       []other:    Visit # Insurance Allowable Requires auth   2 30? (unsure if hard limit- used 26 previously in 2018)    [x]no        []yes:       Pain level:  3/10     SUBJECTIVE:  Pt states that she was sore for about a day after the initial eval, with some intermittent soreness since then. Pt reports no recent falls, but still has the occasional \"giving out\" of her R knee. Pt believes that the taping was helping for the first couple days, but began to feel loose after that time.     OBJECTIVE:   Observation:   Test measurements: endurance, ROM of core, proximal hip and LE for functional self-care, mobility, lifting and ambulation/stair navigation   [] (22963)Reviewed/Progressed HEP activities related to improving balance, coordination, kinesthetic sense, posture, motor skill, proprioception of core, proximal hip and LE for self care, mobility, lifting, and ambulation/stair navigation      Manual Treatments:  PROM / STM / Oscillations-Mobs:  G-I, II, III, IV (PA's, Inf., Post.)  [x] (12887) Provided manual therapy to mobilize LE, proximal hip and/or LS spine soft tissue/joints for the purpose of modulating pain, promoting relaxation,  increasing ROM, reducing/eliminating soft tissue swelling/inflammation/restriction, improving soft tissue extensibility and allowing for proper ROM for normal function with self care, mobility, lifting and ambulation. Modalities:  PM + CP x 15' R knee    Charges:  Timed Code Treatment Minutes: 50   Total Treatment Minutes: 70 (ES, rest breaks)     [] EVAL (LOW) 84965 (typically 20 minutes face-to-face)  [] EVAL (MOD) 13723 (typically 30 minutes face-to-face)  [] EVAL (HIGH) 30152 (typically 45 minutes face-to-face)  [] RE-EVAL     [x] ZB(25928) x  2   [] IONTO  [] NMR (73780) x      [] VASO  [x] Manual (65878) x  1    [] Other:  [] TA x       [] Mech Traction (49515)  [] ES(attended) (51242)      [x] ES (un) (19631):     GOALS: Patient stated goal: Pt would like to be able to walk normally without pain.      Therapist goals for Patient:   Short Term Goals: To be achieved in: 2 weeks  1. Independent in HEP and progression per patient tolerance, in order to prevent re-injury. 2. Patient will have a decrease in pain to facilitate improvement in movement, function, and ADLs as indicated by Functional Deficits.     Long Term Goals: To be achieved in: 8 weeks  1. Disability index score of 35% or less for the LEFS to assist with reaching prior level of function.    2. Patient will demonstrate increased AROM to

## 2018-09-13 ENCOUNTER — HOSPITAL ENCOUNTER (OUTPATIENT)
Dept: PHYSICAL THERAPY | Age: 58
Setting detail: THERAPIES SERIES
Discharge: HOME | End: 2018-09-13
Payer: COMMERCIAL

## 2018-09-13 PROCEDURE — 97140 MANUAL THERAPY 1/> REGIONS: CPT

## 2018-09-13 PROCEDURE — G0283 ELEC STIM OTHER THAN WOUND: HCPCS

## 2018-09-13 PROCEDURE — 97110 THERAPEUTIC EXERCISES: CPT

## 2018-09-13 NOTE — FLOWSHEET NOTE
12/14/17    Exercises/Interventions:     Therapeutic Ex Sets/sec Reps Notes   Pt ed: findings of eval, Harley tape use/removal; ice PRN for pain      Elliptical  Bike   5'    Not billed   Calf stretch on incline board 30\"x3     HS stretch   IT band stretch with strap  Adductor stretch with strap NV  30\"x3  30\"x3      SAQ 3x  Attempted, too sore   SLR  \" c ER 2x10    1# HEP   Hip ABD 2x10  HEP   clamshell 2x10  OVL HEP (back up to [facility] NV)   Prone TKE   HEP   LAQ 90-40 20x  1# HEP   SLS on airex 5\"x6  No UE support  HEP         Step ups  Lat step ups  Step downs 2x10 ea  6\" step  6\"  4\"   B heel raises 30x  Fingertip support         Manual Intervention      STM R knee, SASTM jordan-patella and quad 8'     Harley tape 3'     FR TB band 5'                       NMR re-education      Stair training   Reciprocal pattern                                     Therapeutic Exercise and NMR EXR  [x] (49006) Provided verbal/tactile cueing for activities related to strengthening, flexibility, endurance, ROM for improvements in LE, proximal hip, and core control with self care, mobility, lifting, ambulation.  [] (82320) Provided verbal/tactile cueing for activities related to improving balance, coordination, kinesthetic sense, posture, motor skill, proprioception  to assist with LE, proximal hip, and core control in self care, mobility, lifting, ambulation and eccentric single leg control.      NMR and Therapeutic Activities:    [] (57982 or 48764) Provided verbal/tactile cueing for activities related to improving balance, coordination, kinesthetic sense, posture, motor skill, proprioception and motor activation to allow for proper function of core, proximal hip and LE with self care and ADLs  [] (44380) Gait Re-education- Provided training and instruction to the patient for proper LE, core and proximal hip recruitment and positioning and eccentric body weight control with ambulation re-education including up and down stairs

## 2018-09-17 ENCOUNTER — HOSPITAL ENCOUNTER (OUTPATIENT)
Dept: PHYSICAL THERAPY | Age: 58
Setting detail: THERAPIES SERIES
Discharge: HOME | End: 2018-09-17
Payer: COMMERCIAL

## 2018-09-17 PROCEDURE — 97140 MANUAL THERAPY 1/> REGIONS: CPT

## 2018-09-17 PROCEDURE — 97110 THERAPEUTIC EXERCISES: CPT

## 2018-09-17 PROCEDURE — G0283 ELEC STIM OTHER THAN WOUND: HCPCS

## 2018-09-17 NOTE — FLOWSHEET NOTE
[facility], [address]  Phone: [phone]  Fax [phone]    Physical Therapy Daily Treatment Note  Date:  2018    Patient Name:  Demian Montoya    :  1960  MRN: [id number]  Restrictions/Precautions:    Medical/Treatment Diagnosis Information:  · Diagnosis: S82.141D Fracture of tibial plateau, right, closed, with routine healing, subsequent encounter  · Treatment Diagnosis: MM25.561 Pain in right knee, M25.661 Stiffness in right knee, R26.2 Difficulty in walking  Insurance/Certification information:  PT Insurance Information: [facility]  Physician Information:  Referring Practitioner: Dr. Jim Nath of care signed (Y/N):     Date of Patient follow up with Physician:     G-Code (if applicable):      Date G-Code Applied:  18  PT G-Codes  Functional Assessment Tool Used: LEFS  Score: 54  Functional Limitation: Mobility: Walking and moving around  Mobility: Walking and Moving Around Current Status (): At least 40 percent but less than 60 percent impaired, limited or restricted  Mobility: Walking and Moving Around Goal Status (): At least 20 percent but less than 40 percent impaired, limited or restricted    Progress Note: [x]  Yes  []  No  Next due by: Visit #10       Latex Allergy:  [x]NO      []YES  Preferred Language for Healthcare:   [x]English       []other:    Visit # Insurance Allowable Requires auth   4 Prog note written  #30/30 for insurance (awaiting approval for more visits) 30? (unsure if hard limit- used 26 previously in 2018)    [x]no        []yes:       Pain level:  4-8/10     SUBJECTIVE:  Pt states that she's had more pain the past 2 days; isn't sure why. Pain seems sharp at ant knee and has buckled on her twice today when walking.     OBJECTIVE:   Observation: TTP pat tendon  Test measurements:      RESTRICTIONS/PRECAUTIONS: R  tibial plateau ORIF DOS 12/14/17    Exercises/Interventions:     Therapeutic Ex Sets/sec Reps Notes   Pt ed: findings of evCherri lozano tape use/removal; ice PRN for pain      Elliptical  Bike       Not billed   Calf stretch on incline board 30\"x3     HS stretch   IT band stretch with strap  Adductor stretch with strap  Table-side hip flexor S  Prone quad S 30\"x3  30\"x3  30\"x3   30\"X3 e  30\"x3        Manual  Manual   SAQ 3x  Attempted, too sore   SLR  \" c ER 2x10  2x10  1# HEP   Hip ABD 2x10  1# HEP   clamshell 2x10  OVL HEP (back up to [facility] NV)   Prone TKE  Prone hip ext 10\"x10  2x10  HEP   LAQ 90-40 30x   HEP               Step ups   2x10 ea  2x10   went down to 4\"     Too sore   B heel raises 30x  Fingertip support         Manual Intervention      STM R knee, SASTM jordan-patella and quad and pat tendon 6'     Harley tape  chopat strap trial to pat tendon 3'  1'  Both improved pain   FR TB band                        NMR re-education      Stair training   Reciprocal pattern   SLS 10\"x5                                 Therapeutic Exercise and NMR EXR  [x] (88242) Provided verbal/tactile cueing for activities related to strengthening, flexibility, endurance, ROM for improvements in LE, proximal hip, and core control with self care, mobility, lifting, ambulation.  [] (85266) Provided verbal/tactile cueing for activities related to improving balance, coordination, kinesthetic sense, posture, motor skill, proprioception  to assist with LE, proximal hip, and core control in self care, mobility, lifting, ambulation and eccentric single leg control.      NMR and Therapeutic Activities:    [x] (07523 or 91913) Provided verbal/tactile cueing for activities related to improving balance, coordination, kinesthetic sense, posture, motor skill, proprioception and motor activation to allow for proper function of core, proximal hip and LE with self care and ADLs  [] (13781) Gait Re-education- Provided training and instruction to the patient for proper and ADLs as indicated by Functional Deficits.     Long Term Goals: To be achieved in: 8 weeks  1. Disability index score of 35% or less for the LEFS to assist with reaching prior level of function. 2. Patient will demonstrate increased AROM to 0-145 deg at the knee to allow for normal squatting and kneeling. 3. Patient will demonstrate an increase in Strength to 5/5 for the right knee and at least 4+/5 for the right hip. 4. Patient will be able to squat to 100 deg without increased knee pain and minimal UE support to reverse position. 5. Pt will be able to walk 15-20' without a limp or pain for work duties. Progression Towards Functional goals:  [] Patient is progressing as expected towards functional goals listed. [] Progression is slowed due to complexities listed. [] Progression has been slowed due to co-morbidities. [x] Plan just implemented, too soon to assess goals progression  [] Other:     ASSESSMENT: Pt continues to have pain at ant knee, localized to patellar tendon this visit. Made make-shift chopat brace in clinic with pre-wrap and this improved pt's pain, so will have her purchase an OTC strap to wear when walking/at work as this gave her more comfort than her patellar stabilizer brace. Continued Harley taping as this has given pt improved pain with steps, squatting and LP.  In regards to visit count, pt is awaiting further clarification from her insurance company in order to continue PT (also receiving help from PT registrars with this.)     Treatment/Activity Tolerance:  [x] Patient tolerated treatment well [] Patient limited by fatique  [] Patient limited by pain  [] Patient limited by other medical complications  [] Other:     Prognosis: [x] Good [] Fair  [] Poor    Patient Requires Follow-up: [x] Yes  [] No    PLAN: Gait training, progression of quad strengthening for improved endurance and proper knee control with walking and stair negotiation   [x] Continue per plan of

## 2018-09-17 NOTE — PROGRESS NOTES
when walking.     Current Pain Scale: 4-8/10    Type: []Constant   [x]Intermitment  []Radiating []Localized  []other:     Functional Limitations: []Sitting [x]Standing [x]Walking    [x]Squatting [x]Stairs            []ADL's  []Driving [x]Sports/Recreation  [x]Other:prolonged standing/walking for work duties as     OBJECTIVE:   R knee AROM 0-140  MMT R LE:  Hip flex 5  Hip ext 4+  Hip abd 4-  Hip add 4+  Quads 4  HS  4+  PF 5/5 seated (SL PF unable d/t knee pain but able to do x20 B no pain)    Gait: valgus alignment of R knee, antalgic R stance with lack of TKE, no AD    Joint mobility:    [x]Normal    []Hypo   []Hyper    Palpation: TTP distal ITB and VL, patellar tendon, medial patellar retinaculum    Orthopedic Tests: patellar grind test    OTHER: DL squat to 80 with pain (no pain 0-40)    SLS 18\" eyes open R      ASSESSMENT: Pt continues to have pain in knee at medial patella, patellar tendon, distal quad and distal ITB when walking/standing and particularly with squatting and descending steps. She has weakness still in hip abductors, extensors and quadriceps lending to dec'd stability and valgus alignment of RLE in [address], and she lacks full extension/TKE when walking, which are both likely lending to continued discomfort. She will benefit from continued PT to retsore strength, stability and normalize her gait and improve her ability to do steps and squat for her ADLs and work duties as a .      Response to Treatment:   [x]Patient is responding well to treatment and improvement is noted with regards  to goals   []Patient should continue to improve in reasonable time if they continue HEP   []Patient has plateaued and is no longer responding to skilled PT intervention    []Patient is getting worse and would benefit from return to referring MD   []Patient unable to adhere to initial POC    Functional deficiencies/Impairements which affect ADL's and Reduce overall function: [x]decreased core/proximal hip strength and neuromuscular control - Reduced  overall function   []decreased LE ROM/joint mobility- Reduced overall Function    [x]decreased LE strength- Reduced overall function with gait and steps   [x]difficulty with SLS- Reduced overall function and possible falls risk   [x]pain/difficulty with ambulation- reduced overall function and mobility   [x]unable to perform sport/recreational activity due to pain and dysfunction   []other:       Prognosis/Rehab Potential:    []Excellent   [x]Good    []Fair   []Poor: Toleration of evaluation or treatment:    []Excellent   [x]Good    []Fair   []Poor     New or Updated Goals (if applicable):  [x] No change to goals established upon initial eval/last progress note:  New Goals:    GOALS: Short Term Goals: To be achieved in: 2 weeks  1. Independent in HEP and progression per patient tolerance, in order to prevent re-injury. met  2. Patient will have a decrease in pain to facilitate improvement in movement, function, and ADLs as indicated by Functional Deficits. progress     Long Term Goals: To be achieved in: 8 weeks  1. Disability index score of 35% or less for the LEFS to assist with reaching prior level of function. progress  2. Patient will demonstrate increased AROM to 0-145 deg at the knee to allow for normal squatting and kneeling. progress  3. Patient will demonstrate an increase in Strength to 5/5 for the right knee and at least 4+/5 for the right hip. progress  4. Patient will be able to squat to 100 deg without increased knee pain and minimal UE support to reverse position. progress  5. Pt will be able to walk 15-20' without a limp or pain for work duties.  progress       Rehab Potential:   []Excellent   [x] Good   [] Fair   [] Poor    Plan of Care:  [x] Continue Current Therapy Intervention    Frequency/Duration:  1-2 days per week for 3-4 Weeks:  HEP instruction:   1.  Ther ex including: strength training, ROM, NMR and

## 2018-09-18 PROCEDURE — G8979 MOBILITY GOAL STATUS: HCPCS

## 2018-09-18 PROCEDURE — G8978 MOBILITY CURRENT STATUS: HCPCS

## 2018-09-20 ENCOUNTER — APPOINTMENT (OUTPATIENT)
Dept: PHYSICAL THERAPY | Age: 58
End: 2018-09-20
Payer: COMMERCIAL

## 2018-10-12 ENCOUNTER — HOSPITAL ENCOUNTER (OUTPATIENT)
Dept: WOMENS IMAGING | Age: 58
Discharge: HOME | End: 2018-10-12
Payer: COMMERCIAL

## 2018-10-12 DIAGNOSIS — Z12.31 ENCOUNTER FOR SCREENING MAMMOGRAM FOR BREAST CANCER: ICD-10-CM

## 2018-10-12 PROCEDURE — 77063 BREAST TOMOSYNTHESIS BI: CPT

## 2019-10-14 ENCOUNTER — HOSPITAL ENCOUNTER (OUTPATIENT)
Dept: WOMENS IMAGING | Age: 59
Discharge: HOME | End: 2019-10-14
Payer: COMMERCIAL

## 2019-10-14 DIAGNOSIS — Z12.31 SCREENING MAMMOGRAM, ENCOUNTER FOR: ICD-10-CM

## 2019-10-14 PROCEDURE — 77063 BREAST TOMOSYNTHESIS BI: CPT

## 2020-07-10 ENCOUNTER — OFFICE VISIT (OUTPATIENT)
Dept: ORTHOPEDIC SURGERY | Age: 60
End: 2020-07-10
Payer: COMMERCIAL

## 2020-07-10 ENCOUNTER — TELEPHONE (OUTPATIENT)
Dept: ORTHOPEDIC SURGERY | Age: 60
End: 2020-07-10

## 2020-07-10 VITALS — WEIGHT: 150 LBS | HEIGHT: 63 IN | BODY MASS INDEX: 26.58 KG/M2

## 2020-07-10 PROBLEM — M72.2 PLANTAR FASCIITIS OF LEFT FOOT: Status: ACTIVE | Noted: 2020-07-10

## 2020-07-10 PROCEDURE — 1036F TOBACCO NON-USER: CPT | Performed by: ORTHOPAEDIC SURGERY

## 2020-07-10 PROCEDURE — G8427 DOCREV CUR MEDS BY ELIG CLIN: HCPCS | Performed by: ORTHOPAEDIC SURGERY

## 2020-07-10 PROCEDURE — 99203 OFFICE O/P NEW LOW 30 MIN: CPT | Performed by: ORTHOPAEDIC SURGERY

## 2020-07-10 PROCEDURE — 3017F COLORECTAL CA SCREEN DOC REV: CPT | Performed by: ORTHOPAEDIC SURGERY

## 2020-07-10 PROCEDURE — G8419 CALC BMI OUT NRM PARAM NOF/U: HCPCS | Performed by: ORTHOPAEDIC SURGERY

## 2020-07-10 PROCEDURE — L4361 PNEUMA/VAC WALK BOOT PRE OTS: HCPCS | Performed by: ORTHOPAEDIC SURGERY

## 2020-07-10 NOTE — PROGRESS NOTES
Chief Complaint    Foot Pain (L foot pain )      History of Present Illness:  Corinne Alegre is a 63 y.o. female who is here today for evaluation of left foot pain, she has pain directly on the left heel worse first step in the morning. She has been using a topical cream without significant improvement. She typically works as a  and states her symptoms began on long flights wearing heels. She has been off work with the [employer] pandemic continues to notice symptoms. She has been doing a home stretching program again without significant improvement. Pain Assessment  Location of Pain: Foot  Location Modifiers: Left  Severity of Pain: 9  Quality of Pain: Aching, Throbbing, Sharp, Dull  Duration of Pain: Persistent  Frequency of Pain: Constant  Aggravating Factors: Stretching, Standing, Walking, Stairs  Relieving Factors: Nsaids, Rest  Result of Injury: No  Work-Related Injury: No  Are there other pain locations you wish to document?: No]    Medical History:  Patient's medications, allergies, past medical, surgical, social and family histories were reviewed and updated as appropriate. Review of Systems:  Relevant review of systems reviewed and available in the patient's chart    Vital Signs: There were no vitals filed for this visit. General Exam:   Constitutional: Patient is adequately groomed with no evidence of malnutrition  DTRs: Deep tendon reflexes are intact  Mental Status: The patient is oriented to time, place and person. The patient's mood and affect are appropriate. Lymphatic: The lymphatic examination bilaterally reveals all areas to be without enlargement or induration.     Foot Examination:    Inspection: No obvious deformity    Palpation:  Pain over the plantar medial aspect of the left heel    Range of Motion:  Tight gastrocs and hamstrings    Strength:  5 over 5 throughout with no focal weakness    Special Tests:  Negative Tinel's through the tarsal tunnel    Skin: There are no rashes, ulcerations or lesions. Gait: antalgic    Reflex 2+ and symmetric    Additional Comments:       Additional Examinations:         Right Lower Extremity: Examination of the right lower extremity does not show any tenderness, deformity or injury. Range of motion is unremarkable. There is no gross instability. There are no rashes, ulcerations or lesions. Strength and tone are normal.    Radiology:     X-rays obtained and reviewed in office:  Views 3 views of the left foot are reviewed they show no evidence of periosteal reaction, medullary lesions or osteopenia. Joint spaces are well maintained. No evidence of fracture or dislocations. Assessment : Left foot plantar fasciitis    Impression:  Encounter Diagnoses   Name Primary?  Left foot pain     Plantar fasciitis of left foot Yes       Office Procedures:  Orders Placed This Encounter   Procedures    XR FOOT LEFT (MIN 3 VIEWS)   Floyd MARADIAGA PT [facility] Physical Therapy     Referral Priority:   Routine     Referral Type:   Eval and Treat     Referral Reason:   Specialty Services Required     Requested Specialty:   Physical Therapy     Number of Visits Requested:   1    Breg Short Kim Walking Boot     Patient was prescribed a Breg Short Kim Walking Boot. The left foot will require stabilization / immobilization from this semi-rigid / rigid orthosis to improve their function. The orthosis will assist in protecting the affected area, provide functional support and facilitate healing. Patient was instructed to progress ambulation weight bearing as tolerated in the device. The patient was educated and fit by a healthcare professional with expert knowledge and specialization in brace application while under the direct supervision of the physician. Verbal and written instructions for the use of and application of this item were provided.    They were instructed to contact the office immediately should the brace result in increased pain, decreased sensation, increased swelling or worsening of the condition. Treatment Plan:  We discussed the etiology of plantar fasciitis as well as its operative and nonoperative treatments. Nonoperative treatment includes activity modification, immobilization with cast or boot, support like shoes and inserts, medicines by mouth as appropriate, physical therapy, steroid injections, topicals like is heat and anti-inflammatory creams as well as the use of a night brace. Operative option would be endoscopic gastroc lengthening with partial plantar fasciectomy and release of Apodaca's nerve.   This patient was started on a regimen of walking boot, physical therapy and home exercise program.  She will continue with the anti-inflammatory cream and will followup in 4-6 weeks as needed

## 2020-07-10 NOTE — TELEPHONE ENCOUNTER
7/10/20 [facility] [place]   -  NO PRECERT REQUIRED - PER ONLINE [facility] -  MP

## 2020-07-22 ENCOUNTER — HOSPITAL ENCOUNTER (OUTPATIENT)
Dept: PHYSICAL THERAPY | Age: 60
Setting detail: THERAPIES SERIES
Discharge: HOME | End: 2020-07-22
Payer: COMMERCIAL

## 2020-07-22 PROCEDURE — 97110 THERAPEUTIC EXERCISES: CPT | Performed by: PHYSICAL THERAPIST

## 2020-07-22 PROCEDURE — 97161 PT EVAL LOW COMPLEX 20 MIN: CPT | Performed by: PHYSICAL THERAPIST

## 2020-07-22 NOTE — PLAN OF CARE
[facility], [facility]  [address] [place] [address]  Phone: [phone]  Fax [phone]     Physical Therapy Certification    Dear Referring Practitioner: Dr. Jimmy Mckeon,    We had the pleasure of evaluating the following patient for physical therapy services at [address] [place] [address]. A summary of our findings can be found in the initial assessment below. This includes our plan of care. If you have any questions or concerns regarding these findings, please do not hesitate to contact me at the office phone number checked above. Thank you for the referral.       Physician Signature:_______________________________Date:__________________  By signing above (or electronic signature), therapists plan is approved by physician    Patient: Zenon Logan   : 1960   MRN: [id number]  Referring Physician: Referring Practitioner: Dr. Jimmy Mckeon      Evaluation Date: 2020      Medical Diagnosis Information:  Diagnosis: L foot pain, plantarfasciitis M79.672, M72.2   Treatment Diagnosis: M79.672 L foot pain                                         Insurance information: PT Insurance Information: [facility]       Precautions/ Contra-indications: parathyroid cancer; anxiety , asthma; r tib fib fx    Latex Allergy:  [x]NO      []YES  Preferred Language for Healthcare:   [x]English       []other:    SUBJECTIVE: Patient reports she has been having L foot pain for the past 6 months. Pt reports the pain started in her midfoot and moved to the center of her heel. Pt reports she saw MD and was diagnosed with plantar fasciits. Pt reports she has been in the boot for a couple weeks and does not think it has helped .       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     Living Status/Prior Level of Function: Independent with ADLs and IADLs, lives with  in 2 story home with bedroom and bathroom upstairs; pt was able to hike, walk dog without pain     OBJECTIVE:     ROM LEFT RIGHT   HIP Flex     HIP Abd     HIP Ext     HIP IR     HIP ER     Knee ext     Knee Flex     Ankle PF 45 pain  60   Ankle DF +5 +10   Ankle In 35 40   Ankle Ev 25  25   Strength  LEFT RIGHT   HIP Flexors 4 4   HIP Abductors     HIP Ext     Hip ER     Knee EXT (quad) 4+ 4+   Knee Flex (HS) 5 5   Ankle DF 4 4   Ankle PF 4 5   Ankle Inv 4+ 4   Ankle EV 4+ 4+        Circumference  Mid apex  7 cm prox             Reflexes/Sensation: NT    []Dermatomes/Myotomes intact    [x]Reflexes equal and normal bilaterally   []Other:    Joint mobility: TBA    []Normal    []Hypo   []Hyper    Palpation: tender with palp to calcaneus and post tib tendon     Functional Mobility/Transfers: independent     Posture: IC R>L ; genu recavatum ; pes planus     Bandages/Dressings/Incisions: NA     Gait: decreased stance time on L     Orthopedic Special Tests: NT                       [x] Patient history, allergies, meds reviewed. Medical chart reviewed. See intake form. Review Of Systems (ROS):  [x]Performed Review of systems (Integumentary, CardioPulmonary, Neurological) by intake and observation. Intake form has been scanned into medical record. Patient has been instructed to contact their primary care physician regarding ROS issues if not already being addressed at this time.       Co-morbidities/Complexities (which will affect course of rehabilitation):   []None           Arthritic conditions   []Rheumatoid arthritis (M05.9)  []Osteoarthritis (M19.91)   Cardiovascular conditions   []Hypertension (I10)  []Hyperlipidemia (E78.5)  []Angina pectoris (I20)  []Atherosclerosis (I70)   Musculoskeletal conditions   []Disc pathology   []Congenital spine pathologies   []Prior surgical intervention  []Osteoporosis (M81.8)  []Osteopenia (M85.8)   Endocrine conditions   []Hypothyroid (E03.9)  []Hyperthyroid Gastrointestinal conditions   []Constipation (N21.85)   Metabolic conditions   []Morbid obesity (E66.01)  []Diabetes type 1(E10.65) or 2 (E11.65)   []Neuropathy (G60.9)     Pulmonary conditions   []Asthma (J45)  []Coughing   []COPD (J44.9)   Psychological Disorders  [x]Anxiety (F41.9)  []Depression (F32.9)   []Other:   []Other:          Barriers to/and or personal factors that will affect rehab potential:              []Age  []Sex              []Motivation/Lack of Motivation                        []Co-Morbidities              []Cognitive Function, education/learning barriers              []Environmental, home barriers              []profession/work barriers  []past PT/medical experience  []other:  Justification:     Falls Risk Assessment (30 days):   [x] Falls Risk assessed and no intervention required.   [] Falls Risk assessed and Patient requires intervention due to being higher risk   TUG score (>12s at risk):     [] Falls education provided, including           ASSESSMENT:   Functional Impairments:     []Noted lumbar/proximal hip/LE joint hypomobility   [x]Decreased LE functional ROM   []Decreased core/proximal hip strength and neuromuscular control   [x]Decreased LE functional strength   [x]Reduced balance/proprioceptive control   []other:      Functional Activity Limitations (from functional questionnaire and intake)   []Reduced ability to tolerate prolonged functional positions   [x]Reduced ability or difficulty with changes of positions or transfers between positions   [x]Reduced ability to maintain good posture and demonstrate good body mechanics with sitting, bending, and lifting   []Reduced ability to sleep   [] Reduced ability or tolerance with driving and/or computer work   []Reduced ability to perform lifting, carrying tasks   []Reduced ability to squat   []Reduced ability to forward bend   [x]Reduced ability to ambulate prolonged functional periods/distances/surfaces   [x]Reduced ability to ascend/descend stairs   []Reduced ability to run, hop, cut or jump   []other:    Participation Restrictions   []Reduced participation in self care activities   [x]Reduced participation in home management activities   [x]Reduced participation in work activities   [x]Reduced participation in social activities. [x]Reduced participation in sport/recreation activities. Classification :    []Signs/symptoms consistent with post-surgical status including decreased ROM, strength and function.    []Signs/symptoms consistent with joint sprain/strain   []Signs/symptoms consistent with patella-femoral syndrome   []Signs/symptoms consistent with knee OA/hip OA   []Signs/symptoms consistent with internal derangement of knee/Hip   []Signs/symptoms consistent with functional hip weakness/NMR control      [x]Signs/symptoms consistent with tendinitis/tendinosis    []signs/symptoms consistent with pathology which may benefit from Dry needling      []other:      Prognosis/Rehab Potential:      []Excellent   [x]Good    []Fair   []Poor    Tolerance of evaluation/treatment:    []Excellent   [x]Good    []Fair   []Poor  Physical Therapy Evaluation Complexity Justification  [x] A history of present problem with:  [] no personal factors and/or comorbidities that impact the plan of care;  [x]1-2 personal factors and/or comorbidities that impact the plan of care  []3 personal factors and/or comorbidities that impact the plan of care  [x] An examination of body systems using standardized tests and measures addressing any of the following: body structures and functions (impairments), activity limitations, and/or participation restrictions;:  [x] a total of 1-2 or more elements   [] a total of 3 or more elements   [] a total of 4 or more elements   [x] A clinical presentation with:  [x] stable and/or uncomplicated characteristics   [] evolving clinical presentation with changing characteristics  [] unstable and unpredictable characteristics;   [x] Clinical decision making of [x] low, [] moderate, [] high complexity using standardized patient assessment instrument and/or measurable assessment of functional outcome. [x] EVAL (LOW) 76021 (typically 20 minutes face-to-face)  [] EVAL (MOD) 86900 (typically 30 minutes face-to-face)  [] EVAL (HIGH) 00761 (typically 45 minutes face-to-face)  [] RE-EVAL       PLAN:   Frequency/Duration:  2 days per week for 6 Weeks:  Interventions:  [x]  Therapeutic exercise including: strength training, ROM, for Lower extremity and core   [x]  NMR activation and proprioception for LE, Glutes and Core   [x]  Manual therapy as indicated for LE, Hip and spine to include: Dry Needling/IASTM, STM, PROM, Gr I-IV mobilizations, manipulation. [x] Modalities as needed that may include: thermal agents, E-stim, Biofeedback, US, iontophoresis as indicated  [x] Patient education on joint protection, postural re-education, activity modification, progression of HEP. HEP instruction: (see scanned forms)    GOALS:  Patient stated goal: relieve pain  [] Progressing: [] Met: [] Not Met: [] Adjusted    Therapist goals for Patient:   Short Term Goals: To be achieved in: 2 weeks  1. Independent in HEP and progression per patient tolerance, in order to prevent re-injury. [] Progressing: [] Met: [] Not Met: [] Adjusted  2. Patient will have a decrease in pain to facilitate improvement in movement, function, and ADLs as indicated by Functional Deficits. [] Progressing: [] Met: [] Not Met: [] Adjusted    Long Term Goals: To be achieved in: 6 weeks  1.  Disability index score of 25% or less for the LEFS to assist with reaching prior level of function. [] Progressing: [] Met: [] Not Met: [] Adjusted  2. Patient will demonstrate increased AROM to L ankle DF to +10 to allow for proper joint functioning as indicated by patients Functional Deficits. [] Progressing: [] Met: [] Not Met: [] Adjusted  3. Patient will demonstrate an increase in Strength to good proximal hip strength and control, within 5lb HHD in LE to allow for proper functional mobility as indicated by patients Functional Deficits. [] Progressing: [] Met: [] Not Met: [] Adjusted  4. Patient will return to being able to ambulate, ascend and descend stairs   without increased symptoms or restriction. [] Progressing: [] Met: [] Not Met: [] Adjusted  5.  Pt able to return to work as      [] Progressing: [] Met: [] Not Met: [] Adjusted     Electronically signed by:  Frances Penaloza, PT

## 2020-07-22 NOTE — FLOWSHEET NOTE
[facility], [address] [place]  Phone: [phone]  Fax [phone]    Physical Therapy Treatment Note/ Progress Report:           Date:  2020    Patient Name:  Lorenzo Chávez    :  1960  MRN: [id number]  Restrictions/Precautions:    Medical/Treatment Diagnosis Information:  Diagnosis: L foot pain, plantarfasciitis M79.672, M72.2  Treatment Diagnosis: M79.672 L foot pain  Insurance/Certification information:  PT Insurance Information: [facility]  Physician Information:  Referring Practitioner: Dr. Annabelle Luna  Has the plan of care been signed (Y/N):        []  Yes  [x]  No     Date of Patient follow up with Physician: 2020      Is this a Progress Report:     []  Yes  [x]  No        If Yes:  Date Range for reporting period:  Beginning 2020  Ending    Progress report will be due (10 Rx or 30 days whichever is less):       Recertification will be due (POC Duration  / 90 days whichever is less):  2020      Visit # Insurance Allowable Requires auth   1 30    []no        []yes:     Functional Scale: LEFS 51%   Date assessed:  2020      Therapy Diagnosis/Practice Pattern: 4E       Number of Comorbidities:  []0     [x]1-2    []3+    Latex Allergy:  [x]NO      []YES  Preferred Language for Healthcare:   [x]English       []other:      Pain level:  2-8/10     SUBJECTIVE:  See eval    OBJECTIVE: See eval   Observation:    Test measurements:      RESTRICTIONS/PRECAUTIONS: hx of r prox tib-fib fx; hx of parathyroid cancer     Exercises/Interventions:     Therapeutic Ex (82325) Sets/sec Reps Notes/CUES   Sitting gastroc and soleus stretch  3x30\" ea   Hep    Sitting PF stretch  10\"x5   Hep    Sitting doming exercise  5\" x10   Hep    edu diagnosis, precautions  5 min     Rolling to L foot in sitting  3 min   Hep                                              Manual Intervention (69912)       NV NMR re-education (19871)   CUES NEEDED                                                         Therapeutic Activity (67784)                                                Therapeutic Exercise and NMR EXR  [] (88353) Provided verbal/tactile cueing for activities related to strengthening, flexibility, endurance, ROM for improvements in LE, proximal hip, and core control with self care, mobility, lifting, ambulation.  [] (11078) Provided verbal/tactile cueing for activities related to improving balance, coordination, kinesthetic sense, posture, motor skill, proprioception  to assist with LE, proximal hip, and core control in self care, mobility, lifting, ambulation and eccentric single leg control.      NMR and Therapeutic Activities:    [] (36059 or 01083) Provided verbal/tactile cueing for activities related to improving balance, coordination, kinesthetic sense, posture, motor skill, proprioception and motor activation to allow for proper function of core, proximal hip and LE with self care and ADLs  [] (15923) Gait Re-education- Provided training and instruction to the patient for proper LE, core and proximal hip recruitment and positioning and eccentric body weight control with ambulation re-education including up and down stairs     Home Exercise Program:    [x] (60461) Reviewed/Progressed HEP activities related to strengthening, flexibility, endurance, ROM of core, proximal hip and LE for functional self-care, mobility, lifting and ambulation/stair navigation   [] (46551)Reviewed/Progressed HEP activities related to improving balance, coordination, kinesthetic sense, posture, motor skill, proprioception of core, proximal hip and LE for self care, mobility, lifting, and ambulation/stair navigation      Manual Treatments:  PROM / STM / Oscillations-Mobs:  G-I, II, III, IV (PA's, Inf., Post.)  [] (92763) Provided manual therapy to mobilize LE, proximal hip and/or LS spine soft tissue/joints for the purpose of modulating pain, promoting relaxation,  increasing ROM, reducing/eliminating soft tissue swelling/inflammation/restriction, improving soft tissue extensibility and allowing for proper ROM for normal function with self care, mobility, lifting and ambulation. Modalities:     [] GAME READY (VASO)- for significant edema, swelling, pain control. Charges:  Timed Code Treatment Minutes: 15   Total Treatment Minutes: 40     BWC time in/time out:   (and requires time in and out for each CPT code)    [x] EVAL (LOW) 19390 (typically 20 minutes face-to-face)  [] EVAL (MOD) 51228 (typically 30 minutes face-to-face)  [] EVAL (HIGH) 63215 (typically 45 minutes face-to-face)  [] RE-EVAL     [x] GD(79250) x1     [] IONTO  [] NMR (50006) x     [] VASO  [] Manual ([address] [facility]) x      [] Other:  [] TA x      [] Mech Traction (30053)  [] ES(attended) (42583)      [] ES (un) (49491):       GOALS:   Therapist goals for Patient:   Short Term Goals: To be achieved in: 2 weeks  1. Independent in HEP and progression per patient tolerance, in order to prevent re-injury. [] Progressing: [] Met: [] Not Met: [] Adjusted  2. Patient will have a decrease in pain to facilitate improvement in movement, function, and ADLs as indicated by Functional Deficits. [] Progressing: [] Met: [] Not Met: [] Adjusted    Long Term Goals: To be achieved in: 6 weeks  1. Disability index score of 25% or less for the LEFS to assist with reaching prior level of function. [] Progressing: [] Met: [] Not Met: [] Adjusted  2. Patient will demonstrate increased AROM to L ankle DF to +10 to allow for proper joint functioning as indicated by patients Functional Deficits. [] Progressing: [] Met: [] Not Met: [] Adjusted  3. Patient will demonstrate an increase in Strength to good proximal hip strength and control, within 5lb HHD in LE to allow for proper functional mobility as indicated by patients Functional Deficits.  [] Progressing: [] Met: [] Not Met: [] Adjusted  4. Patient will return to being able to ambulate, ascend and descend stairs   without increased symptoms or restriction. [] Progressing: [] Met: [] Not Met: [] Adjusted  5. Pt able to return to work as      [] Progressing: [] Met: [] Not Met: [] Adjusted    Progression Towards Functional goals:  [] Patient is progressing as expected towards functional goals listed. [] Progression is slowed due to complexities listed. [] Progression has been slowed due to co-morbidities. [x] Plan just implemented, too soon to assess goals progression  [] Other:         Overall Progression Towards Functional goals/ Treatment Progress Update:  [] Patient is progressing as expected towards functional goals listed. [] Progression is slowed due to complexities/Impairments listed. [] Progression has been slowed due to co-morbidities.   [x] Plan just implemented, too soon to assess goals progression <30days   [] Goals require adjustment due to lack of progress  [] Patient is not progressing as expected and requires additional follow up with physician  [] Other    Prognosis for POC: [x] Good [] Fair  [] Poor      Patient requires continued skilled intervention: [x] Yes  [] No    Treatment/Activity Tolerance:  [x] Patient able to complete treatment  [] Patient limited by fatigue  [] Patient limited by pain    [] Patient limited by other medical complications  [] Other:     ASSESSMENT: see eval     Return to Play: (if applicable)   []  Stage 1: Intro to Strength   []  Stage 2: Return to Run and Strength   []  Stage 3: Return to Jump and Strength   []  Stage 4: Dynamic Strength and Agility   []  Stage 5: Sport Specific Training     []  Ready to Return to Play, Meets All Above Stages   []  Not Ready for Return to Sports   Comments:                               PLAN: See eval  [] Continue per plan of care [] Alter current plan (see comments above)  [x] Plan of care initiated [] Hold

## 2020-07-24 ENCOUNTER — HOSPITAL ENCOUNTER (OUTPATIENT)
Dept: PHYSICAL THERAPY | Age: 60
Setting detail: THERAPIES SERIES
Discharge: HOME | End: 2020-07-24
Payer: COMMERCIAL

## 2020-07-24 PROCEDURE — 97140 MANUAL THERAPY 1/> REGIONS: CPT

## 2020-07-24 PROCEDURE — 97110 THERAPEUTIC EXERCISES: CPT

## 2020-07-24 PROCEDURE — 97016 VASOPNEUMATIC DEVICE THERAPY: CPT

## 2020-07-24 NOTE — FLOWSHEET NOTE
[facility],  [address] [place]  Phone: [phone]  Fax [phone]    Physical Therapy Treatment Note/ Progress Report:           Date:  2020    Patient Name:  Anthony Yadav    :  1960  MRN: [id number]  Restrictions/Precautions:    Medical/Treatment Diagnosis Information:  Diagnosis: L foot pain, plantarfasciitis M79.672, M72.2  Treatment Diagnosis: M79.672 L foot pain  Insurance/Certification information:  PT Insurance Information: [facility]  Physician Information:  Referring Practitioner: Dr. Mani Nunez  Has the plan of care been signed (Y/N):        []  Yes  [x]  No     Date of Patient follow up with Physician: 2020      Is this a Progress Report:     []  Yes  [x]  No        If Yes:  Date Range for reporting period:  Beginning 2020  Ending    Progress report will be due (10 Rx or 30 days whichever is less):       Recertification will be due (POC Duration  / 90 days whichever is less):  2020      Visit # Insurance Allowable Requires auth   2 30    []no        []yes:     Functional Scale: LEFS 51%   Date assessed:  2020      Therapy Diagnosis/Practice Pattern: 4E       Number of Comorbidities:  []0     [x]1-2    []3+    Latex Allergy:  [x]NO      []YES  Preferred Language for Healthcare:   [x]English       []other:      Pain level:  2-8/10     SUBJECTIVE:  Pt states that she was very painful after her first visit- had to take pain medication that she had left over from her knee surgery. Is not sure if the boot is helping.      OBJECTIVE: See eval   Observation:    Test measurements:      RESTRICTIONS/PRECAUTIONS: hx of r prox tib-fib fx; hx of parathyroid cancer     Exercises/Interventions:     Therapeutic Ex (57477) Sets/sec Reps Notes/CUES   Sitting gastroc and soleus stretch on 1/2 foam wedge 3x30\" ea   Hep    Sitting PF stretch    Hep    Sitting doming exercise  5\" x10 Hep    edu diagnosis, precautions       Rolling to L foot in sitting     Hep    Ankle inv, ev 20x ea  Red TB, HEP                                       Manual Intervention (23300)      STM/IASTM L calf and PF- light d/t adverse reaction to light treatment LV 18'     TCJ mob post GIII 15\"x4     Toe ext stretch, PF stretch 15\"x3 ea                       NMR re-education (22147)   CUES NEEDED   BAPS AP, ML, circles clockwise, ccw 10xea  lvl 2 seated, cues for FHB                                                   Therapeutic Activity (09729)                                                Therapeutic Exercise and NMR EXR  [x] (16583) Provided verbal/tactile cueing for activities related to strengthening, flexibility, endurance, ROM for improvements in LE, proximal hip, and core control with self care, mobility, lifting, ambulation.  [] (69780) Provided verbal/tactile cueing for activities related to improving balance, coordination, kinesthetic sense, posture, motor skill, proprioception  to assist with LE, proximal hip, and core control in self care, mobility, lifting, ambulation and eccentric single leg control.      NMR and Therapeutic Activities:    [] (11748 or 88810) Provided verbal/tactile cueing for activities related to improving balance, coordination, kinesthetic sense, posture, motor skill, proprioception and motor activation to allow for proper function of core, proximal hip and LE with self care and ADLs  [] (77367) Gait Re-education- Provided training and instruction to the patient for proper LE, core and proximal hip recruitment and positioning and eccentric body weight control with ambulation re-education including up and down stairs     Home Exercise Program:    [x] (89968) Reviewed/Progressed HEP activities related to strengthening, flexibility, endurance, ROM of core, proximal hip and LE for functional self-care, mobility, lifting and ambulation/stair navigation   [] (10643)Reviewed/Progressed HEP activities related to improving balance, coordination, kinesthetic sense, posture, motor skill, proprioception of core, proximal hip and LE for self care, mobility, lifting, and ambulation/stair navigation      Manual Treatments:  PROM / STM / Oscillations-Mobs:  G-I, II, III, IV (PA's, Inf., Post.)  [] (68111) Provided manual therapy to mobilize LE, proximal hip and/or LS spine soft tissue/joints for the purpose of modulating pain, promoting relaxation,  increasing ROM, reducing/eliminating soft tissue swelling/inflammation/restriction, improving soft tissue extensibility and allowing for proper ROM for normal function with self care, mobility, lifting and ambulation. Modalities:     [] GAME READY (VASO)- for significant edema, swelling, pain control. Charges:  Timed Code Treatment Minutes: 35   Total Treatment Minutes: 50 (GR)     BWC time in/time out:   (and requires time in and out for each CPT code)    [x] EVAL (LOW) 99698 (typically 20 minutes face-to-face)  [] EVAL (MOD) 25088 (typically 30 minutes face-to-face)  [] EVAL (HIGH) 74230 (typically 45 minutes face-to-face)  [] RE-EVAL     [x] CY(64141) x1     [] IONTO  [] NMR (71299) x     [x] VASO  [x] Manual ([id number]) x 1    [] Other:  [] TA x      [] Mech Traction (52045)  [] ES(attended) (44640)      [] ES (un) (02368):       GOALS:   Therapist goals for Patient:   Short Term Goals: To be achieved in: 2 weeks  1. Independent in HEP and progression per patient tolerance, in order to prevent re-injury. [] Progressing: [] Met: [] Not Met: [] Adjusted  2. Patient will have a decrease in pain to facilitate improvement in movement, function, and ADLs as indicated by Functional Deficits. [] Progressing: [] Met: [] Not Met: [] Adjusted    Long Term Goals: To be achieved in: 6 weeks  1. Disability index score of 25% or less for the LEFS to assist with reaching prior level of function. [] Progressing: [] Met: [] Not Met: [] Adjusted  2.  Patient will demonstrate increased AROM to L ankle DF to +10 to allow for proper joint functioning as indicated by patients Functional Deficits. [] Progressing: [] Met: [] Not Met: [] Adjusted  3. Patient will demonstrate an increase in Strength to good proximal hip strength and control, within 5lb HHD in LE to allow for proper functional mobility as indicated by patients Functional Deficits. [] Progressing: [] Met: [] Not Met: [] Adjusted  4. Patient will return to being able to ambulate, ascend and descend stairs   without increased symptoms or restriction. [] Progressing: [] Met: [] Not Met: [] Adjusted  5. Pt able to return to work as      [] Progressing: [] Met: [] Not Met: [] Adjusted    Progression Towards Functional goals:  [] Patient is progressing as expected towards functional goals listed. [] Progression is slowed due to complexities listed. [] Progression has been slowed due to co-morbidities. [x] Plan just implemented, too soon to assess goals progression  [] Other:         Overall Progression Towards Functional goals/ Treatment Progress Update:  [] Patient is progressing as expected towards functional goals listed. [] Progression is slowed due to complexities/Impairments listed. [] Progression has been slowed due to co-morbidities. [x] Plan just implemented, too soon to assess goals progression <30days   [] Goals require adjustment due to lack of progress  [] Patient is not progressing as expected and requires additional follow up with physician  [] Other    Prognosis for POC: [x] Good [] Fair  [] Poor      Patient requires continued skilled intervention: [x] Yes  [] No    Treatment/Activity Tolerance:  [x] Patient able to complete treatment  [] Patient limited by fatigue  [] Patient limited by pain    [] Patient limited by other medical complications  [] Other:     ASSESSMENT: Pt tolerated IASTM well, but did lightly d/t adverse reaction to light TE on her first visit.  Tactile and verbal cues needed for foot/ankle strengthening, january on BAPS board. Return to Play: (if applicable)   []  Stage 1: Intro to Strength   []  Stage 2: Return to Run and Strength   []  Stage 3: Return to Jump and Strength   []  Stage 4: Dynamic Strength and Agility   []  Stage 5: Sport Specific Training     []  Ready to Return to Play, Meets All Above Stages   []  Not Ready for Return to Sports   Comments:                               PLAN:   [x] Continue per plan of care [] Alter current plan (see comments above)  [] Plan of care initiated [] Hold pending MD visit [] Discharge      Electronically signed by:  Nina Carvalho, PT, DPT    Note: If patient does not return for scheduled/ recommended follow up visits, this note will serve as a discharge from care along with most recent update on progress. Access Code: [email or web address]   URL: [email or web address]   Date: 07/24/2020   Prepared by: Nina Carvalho     Exercises   Long Sitting Calf Stretch with Strap - 3 reps - 1 sets - 30 hold - 2x daily - 7x weekly   Long Sitting Soleus Stretch on Bolster with Strap - 3 reps - 1 sets - 30 hold - 2x daily - 7x weekly   Seated Plantar Fascia Stretch - 5 reps - 1 sets - 10 hold - 2x daily - 7x weekly   Seated Arch Lifts - 10 reps - 1 sets - 5 hold - 2x daily - 7x weekly   Long Sitting Ankle Eversion with Resistance - 10 reps - 2 sets - 1x daily - 7x weekly   Long Sitting Ankle Inversion with Resistance - 10 reps - 2 sets - 1x daily - 7x weekly

## 2020-07-28 ENCOUNTER — HOSPITAL ENCOUNTER (OUTPATIENT)
Dept: PHYSICAL THERAPY | Age: 60
Setting detail: THERAPIES SERIES
Discharge: HOME | End: 2020-07-28
Payer: COMMERCIAL

## 2020-07-28 PROCEDURE — 97016 VASOPNEUMATIC DEVICE THERAPY: CPT

## 2020-07-28 PROCEDURE — 97140 MANUAL THERAPY 1/> REGIONS: CPT

## 2020-07-28 PROCEDURE — 97110 THERAPEUTIC EXERCISES: CPT

## 2020-07-28 NOTE — FLOWSHEET NOTE
foot structure, has adequate flexibility but needs arch support for more stability in her shoes (she got new gym shoe with neutral footbed but could benefit from OTC orthotic in them.) Rec powerstep vs superfeet, but also can wait to see MD if wants to go through DME. Also asked pt to purchase foot rubz ball for self MFR. Discussed options for ionto if pain persists-can ask MD at visit next week. 10'     Standing gastroc and soleus stretch with towel roll for arch support 3x30\" ea   Hep    Sitting PF stretch    Hep    Sitting doming exercise   Toe yoga 5\" x20   x20  Hep          Rolling to L foot in sitting  3 min   Hep    Ankle inv, ev 20x ea  Red TB, HEP                                       Manual Intervention (53622)      STM/IASTM L calf and PF-  15'     TCJ mob post GIII 15\"x5     Toe ext stretch, PF stretch 15\"x3 ea                       NMR re-education (66880)   CUES NEEDED   BAPS AP, ML, circles clockwise, ccw 20xea  lvl 2 seated, cues for FHB                                                   Therapeutic Activity (00997)                                                Therapeutic Exercise and NMR EXR  [x] (92575) Provided verbal/tactile cueing for activities related to strengthening, flexibility, endurance, ROM for improvements in LE, proximal hip, and core control with self care, mobility, lifting, ambulation.  [] (62380) Provided verbal/tactile cueing for activities related to improving balance, coordination, kinesthetic sense, posture, motor skill, proprioception  to assist with LE, proximal hip, and core control in self care, mobility, lifting, ambulation and eccentric single leg control.      NMR and Therapeutic Activities:    [x] (75364 or 99734) Provided verbal/tactile cueing for activities related to improving balance, coordination, kinesthetic sense, posture, motor skill, proprioception and motor activation to allow for proper function of core, proximal hip and LE with self care and ADLs  [] (27449) patient tolerance, in order to prevent re-injury. [] Progressing: [] Met: [] Not Met: [] Adjusted  2. Patient will have a decrease in pain to facilitate improvement in movement, function, and ADLs as indicated by Functional Deficits. [] Progressing: [] Met: [] Not Met: [] Adjusted    Long Term Goals: To be achieved in: 6 weeks  1. Disability index score of 25% or less for the LEFS to assist with reaching prior level of function. [] Progressing: [] Met: [] Not Met: [] Adjusted  2. Patient will demonstrate increased AROM to L ankle DF to +10 to allow for proper joint functioning as indicated by patients Functional Deficits. [] Progressing: [] Met: [] Not Met: [] Adjusted  3. Patient will demonstrate an increase in Strength to good proximal hip strength and control, within 5lb HHD in LE to allow for proper functional mobility as indicated by patients Functional Deficits. [] Progressing: [] Met: [] Not Met: [] Adjusted  4. Patient will return to being able to ambulate, ascend and descend stairs   without increased symptoms or restriction. [] Progressing: [] Met: [] Not Met: [] Adjusted  5. Pt able to return to work as      [] Progressing: [] Met: [] Not Met: [] Adjusted    Progression Towards Functional goals:  [] Patient is progressing as expected towards functional goals listed. [] Progression is slowed due to complexities listed. [] Progression has been slowed due to co-morbidities. [x] Plan just implemented, too soon to assess goals progression  [] Other:         Overall Progression Towards Functional goals/ Treatment Progress Update:  [] Patient is progressing as expected towards functional goals listed. [] Progression is slowed due to complexities/Impairments listed. [] Progression has been slowed due to co-morbidities.   [x] Plan just implemented, too soon to assess goals progression <30days   [] Goals require adjustment due to lack of progress  [] Patient is not progressing as Eversion with Resistance - 10 reps - 2 sets - 1x daily - 7x weekly   Long Sitting Ankle Inversion with Resistance - 10 reps - 2 sets - 1x daily - 7x weekly

## 2020-07-31 ENCOUNTER — HOSPITAL ENCOUNTER (OUTPATIENT)
Dept: PHYSICAL THERAPY | Age: 60
Setting detail: THERAPIES SERIES
Discharge: HOME | End: 2020-07-31
Payer: COMMERCIAL

## 2020-07-31 PROCEDURE — 97016 VASOPNEUMATIC DEVICE THERAPY: CPT

## 2020-07-31 PROCEDURE — 97110 THERAPEUTIC EXERCISES: CPT

## 2020-07-31 PROCEDURE — 97140 MANUAL THERAPY 1/> REGIONS: CPT

## 2020-07-31 NOTE — FLOWSHEET NOTE
[facility],  [address]  Phone: [phone]  Fax [phone]    Physical Therapy Treatment Note/ Progress Report:           Date:  2020    Patient Name:  Earline Belcher    :  1960  MRN: [id number]  Restrictions/Precautions:    Medical/Treatment Diagnosis Information:  Diagnosis: L foot pain, plantarfasciitis M79.672, M72.2  Treatment Diagnosis: M79.672 L foot pain  Insurance/Certification information:  PT Insurance Information: [facility]  Physician Information:  Referring Practitioner: Dr. Angela Levin  Has the plan of care been signed (Y/N):        []  Yes  [x]  No     Date of Patient follow up with Physician: 2020      Is this a Progress Report:     []  Yes  [x]  No        If Yes:  Date Range for reporting period:  Beginning 2020  Ending    Progress report will be due (10 Rx or 30 days whichever is less):       Recertification will be due (POC Duration  / 90 days whichever is less):  2020      Visit # Insurance Allowable Requires auth   4 30    []no        []yes:     Functional Scale: LEFS 51%   Date assessed:  2020      Therapy Diagnosis/Practice Pattern: 4E       Number of Comorbidities:  []0     [x]1-2    []3+    Latex Allergy:  [x]NO      []YES  Preferred Language for Healthcare:   [x]English       []other:      Pain level:  2-8/10     SUBJECTIVE:  Pt felt alright after LV, has taken herself out of the boot today because it hurts so bad to walk in it compared to her oofos flip flops with arch support. She would like to have PT ask MD for dexamethasone to use with ionto.      OBJECTIVE:    Observation: still TTP med calc tubercle and proximal PF   Test measurements:      RESTRICTIONS/PRECAUTIONS: hx of r prox tib-fib fx; hx of parathyroid cancer     Exercises/Interventions:     Therapeutic Ex (70706) Sets/sec Reps Notes/CUES   Pt ed: discussed foot structure, has adequate flexibility but needs arch support for more stability in her shoes (she got new gym shoe with neutral footbed but could benefit from OTC orthotic in them.) Rec powerstep vs superfeet, but also can wait to see MD if wants to go through DME. Also asked pt to purchase foot rubz ball for self MFR. Discussed options for ionto if pain persists-can ask MD at visit next week. Standing gastroc and soleus stretch with towel roll for arch support 3x30\" ea   Hep    Sitting PF stretch    Hep    Sitting doming exercise   Toe yoga-in standing today 5\" x20   x20  Hep          Rolling to L foot in sitting  3 min   Hep    Ankle inv, ev (heel anchored) 30x ea  GVL, HEP   Sitting PF eccentrics on 1/2 FR 2x10  No weight on knee yet-sore in heel                                 Manual Intervention (15840)      STM/IASTM L calf and PF-  15'     TCJ mob post GIII 15\"x5     Toe ext stretch, PF stretch 15\"x3 ea                       NMR re-education (21422)   CUES NEEDED   BAPS AP, ML, circles clockwise, ccw 20xea  lvl 2 seated, cues for FHB   SLS with cue for toes/arch and pelvis 10\"x10                                                Therapeutic Activity (96945)                                                Therapeutic Exercise and NMR EXR  [x] (03629) Provided verbal/tactile cueing for activities related to strengthening, flexibility, endurance, ROM for improvements in LE, proximal hip, and core control with self care, mobility, lifting, ambulation.  [] (03172) Provided verbal/tactile cueing for activities related to improving balance, coordination, kinesthetic sense, posture, motor skill, proprioception  to assist with LE, proximal hip, and core control in self care, mobility, lifting, ambulation and eccentric single leg control.      NMR and Therapeutic Activities:    [x] (76980 or 78489) Provided verbal/tactile cueing for activities related to improving balance, coordination, kinesthetic sense, posture, motor skill, proprioception and motor activation to allow for proper function of core, proximal hip and LE with self care and ADLs  [] (33572) Gait Re-education- Provided training and instruction to the patient for proper LE, core and proximal hip recruitment and positioning and eccentric body weight control with ambulation re-education including up and down stairs     Home Exercise Program:    [x] (22475) Reviewed/Progressed HEP activities related to strengthening, flexibility, endurance, ROM of core, proximal hip and LE for functional self-care, mobility, lifting and ambulation/stair navigation   [] (08549)Reviewed/Progressed HEP activities related to improving balance, coordination, kinesthetic sense, posture, motor skill, proprioception of core, proximal hip and LE for self care, mobility, lifting, and ambulation/stair navigation      Manual Treatments:  PROM / STM / Oscillations-Mobs:  G-I, II, III, IV (PA's, Inf., Post.)  [x] (81131) Provided manual therapy to mobilize LE, proximal hip and/or LS spine soft tissue/joints for the purpose of modulating pain, promoting relaxation,  increasing ROM, reducing/eliminating soft tissue swelling/inflammation/restriction, improving soft tissue extensibility and allowing for proper ROM for normal function with self care, mobility, lifting and ambulation.      Modalities:     [x] GAME READY (VASO)- for significant edema, swelling, pain control. (med comp)  Charges:  Timed Code Treatment Minutes: 40   Total Treatment Minutes: 55 (GR)     BWC time in/time out:   (and requires time in and out for each CPT code)    [] EVAL (LOW) 18388 (typically 20 minutes face-to-face)  [] EVAL (MOD) 61240 (typically 30 minutes face-to-face)  [] EVAL (HIGH) 49294 (typically 45 minutes face-to-face)  [] RE-EVAL     [x] CV(94293) x2    [] IONTO  [] NMR (51261) x     [x] VASO  [x] Manual (85536) x 1    [] Other:  [] TA x      [] Mech Traction (54250)  [] ES(attended) (18372)      [] ES (un) (10331): too soon to assess goals progression <30days   [] Goals require adjustment due to lack of progress  [] Patient is not progressing as expected and requires additional follow up with physician  [] Other    Prognosis for POC: [x] Good [] Fair  [] Poor      Patient requires continued skilled intervention: [x] Yes  [] No    Treatment/Activity Tolerance:  [x] Patient able to complete treatment  [] Patient limited by fatigue  [] Patient limited by pain    [] Patient limited by other medical complications  [] Other:     ASSESSMENT: Pt again had good janeen for slightly lighter IASTM. Asked MD for script for dex for ionto as pain levels have not reduced. Also reminded that would like pt to have addition of OTC orthotics to neutral shoes for more stability, pt is agreeable. Asked pt to wear gym shoes to NV and to continue to attempt to wear boot as directed by MD until her f/u next week. Return to Play: (if applicable)   []  Stage 1: Intro to Strength   []  Stage 2: Return to Run and Strength   []  Stage 3: Return to Jump and Strength   []  Stage 4: Dynamic Strength and Agility   []  Stage 5: Sport Specific Training     []  Ready to Return to Play, Meets All Above Stages   []  Not Ready for Return to Sports   Comments:                               PLAN: OTC orthotics, ionto request to MD.  [x] Continue per plan of care [] Alter current plan (see comments above)  [] Plan of care initiated [] Hold pending MD visit [] Discharge      Electronically signed by:  Dontae Moody PT, DPT    Note: If patient does not return for scheduled/ recommended follow up visits, this note will serve as a discharge from care along with most recent update on progress. Access Code: [email or web address]   URL: [email or web address]   Date: 07/24/2020   Prepared by: Kathy Thomas     Exercises   Long Sitting Calf Stretch with Strap - 3 reps - 1 sets - 30 hold - 2x daily - 7x weekly   Long Sitting Soleus Stretch on Bolster with Strap - 3 reps -

## 2020-08-03 RX ORDER — DEXAMETHASONE SODIUM PHOSPHATE 4 MG/ML
4 INJECTION, SOLUTION INTRA-ARTICULAR; INTRALESIONAL; INTRAMUSCULAR; INTRAVENOUS; SOFT TISSUE ONCE
Qty: 1 ML | Refills: 0 | Status: SHIPPED | OUTPATIENT
Start: 2020-08-03 | End: 2020-08-03

## 2020-08-04 ENCOUNTER — HOSPITAL ENCOUNTER (OUTPATIENT)
Dept: PHYSICAL THERAPY | Age: 60
Setting detail: THERAPIES SERIES
Discharge: HOME | End: 2020-08-04
Payer: COMMERCIAL

## 2020-08-07 ENCOUNTER — HOSPITAL ENCOUNTER (OUTPATIENT)
Dept: PHYSICAL THERAPY | Age: 60
Setting detail: THERAPIES SERIES
Discharge: HOME | End: 2020-08-07
Payer: COMMERCIAL

## 2020-08-07 ENCOUNTER — OFFICE VISIT (OUTPATIENT)
Dept: ORTHOPEDIC SURGERY | Age: 60
End: 2020-08-07
Payer: COMMERCIAL

## 2020-08-07 VITALS — BODY MASS INDEX: 26.58 KG/M2 | WEIGHT: 150 LBS | HEIGHT: 63 IN

## 2020-08-07 PROCEDURE — L3060 FOOT ARCH SUPP LONGITUD/META: HCPCS | Performed by: ORTHOPAEDIC SURGERY

## 2020-08-07 PROCEDURE — 97140 MANUAL THERAPY 1/> REGIONS: CPT

## 2020-08-07 PROCEDURE — 97112 NEUROMUSCULAR REEDUCATION: CPT

## 2020-08-07 PROCEDURE — G8419 CALC BMI OUT NRM PARAM NOF/U: HCPCS | Performed by: ORTHOPAEDIC SURGERY

## 2020-08-07 PROCEDURE — 99213 OFFICE O/P EST LOW 20 MIN: CPT | Performed by: ORTHOPAEDIC SURGERY

## 2020-08-07 PROCEDURE — 97110 THERAPEUTIC EXERCISES: CPT

## 2020-08-07 PROCEDURE — 1036F TOBACCO NON-USER: CPT | Performed by: ORTHOPAEDIC SURGERY

## 2020-08-07 PROCEDURE — 3017F COLORECTAL CA SCREEN DOC REV: CPT | Performed by: ORTHOPAEDIC SURGERY

## 2020-08-07 PROCEDURE — G8427 DOCREV CUR MEDS BY ELIG CLIN: HCPCS | Performed by: ORTHOPAEDIC SURGERY

## 2020-08-07 PROCEDURE — 97033 APP MDLTY 1+IONTPHRSIS EA 15: CPT

## 2020-08-07 RX ORDER — DEXAMETHASONE SODIUM PHOSPHATE 4 MG/ML
4 INJECTION, SOLUTION INTRA-ARTICULAR; INTRALESIONAL; INTRAMUSCULAR; INTRAVENOUS; SOFT TISSUE ONCE
Qty: 1 ML | Refills: 0 | Status: SHIPPED | OUTPATIENT
Start: 2020-08-07 | End: 2020-08-10 | Stop reason: CLARIF

## 2020-08-07 NOTE — PROGRESS NOTES
Chief Complaint    Follow-up (Left foot plantar fasciitis)      History of Present Illness:  Rosita Magallon is a 63 y.o. female who is here for follow-up, has been doing physical therapy for left foot plantar fasciitis. Overall is seen improvements. Will start iontophoresis next week once medication has been ordered correctly. She notices improvement of symptoms when wearing an over-the-counter insert. Works as an , is recently gone back to work and states her symptoms continue to improve despite being on her feet. Pain Assessment  Location of Pain: Foot  Location Modifiers: Left  Severity of Pain: 7  Quality of Pain: Aching  Duration of Pain: Persistent  Frequency of Pain: Constant  Limiting Behavior: Yes  Relieving Factors: Rest  Result of Injury: No  Work-Related Injury: No  Are there other pain locations you wish to document?: No]    Medical History:  Patient's medications, allergies, past medical, surgical, social and family histories were reviewed and updated as appropriate. Review of Systems:  Relevant review of systems reviewed and available in the patient's chart    Vital Signs: There were no vitals filed for this visit. General Exam:   Constitutional: Patient is adequately groomed with no evidence of malnutrition  DTRs: Deep tendon reflexes are intact  Mental Status: The patient is oriented to time, place and person. The patient's mood and affect are appropriate. Lymphatic: The lymphatic examination bilaterally reveals all areas to be without enlargement or induration. Foot Examination:    Inspection: No obvious deformity    Palpation:  Pain over the plantar medial aspect of the left heel has improved,    Range of Motion:   gastrocs and hamstring tightness has improved    Strength:  5/5 throughout with no focal weakness    Special Tests:  Negative Tinel's through the tarsal tunnel    Skin: There are no rashes, ulcerations or lesions.     Gait: antalgic    Reflex 2+ and symmetric    Additional Comments:       Additional Examinations:         Right Lower Extremity: Examination of the right lower extremity does not show any tenderness, deformity or injury. Range of motion is unremarkable. There is no gross instability. There are no rashes, ulcerations or lesions. Strength and tone are normal.        Assessment : Left foot plantar fasciitis    Impression:  Encounter Diagnosis   Name Primary?  Plantar fasciitis of left foot Yes       Office Procedures:  Orders Placed This Encounter   Procedures    Powerstep Custom Post     Patient was prescribed Powerstep Custom Post Inserts. The left FOOT will require stabilization / support from this semi-rigid / rigid orthosis to improve their function. The orthosis will assist in protecting the affected area, provide functional support and facilitate healing. The patient was educated and fit by a healthcare professional with expert knowledge and specialization in brace application while under the direct supervision of the treating physician. Verbal and written instructions for the use of and application of this item were provided. They were instructed to contact the office immediately should the brace result in increased pain, decreased sensation, increased swelling or worsening of the condition. Treatment Plan: Provided power steps today and I ordered dexamethasone for iontophoresis. Follow-up in 1 to 2 months. Patient agrees with this plan, all of their questions were answered best of our ability and to their satisfaction.

## 2020-08-07 NOTE — FLOWSHEET NOTE
improving balance, coordination, kinesthetic sense, posture, motor skill, proprioception and motor activation to allow for proper function of core, proximal hip and LE with self care and ADLs  [] (05292) Gait Re-education- Provided training and instruction to the patient for proper LE, core and proximal hip recruitment and positioning and eccentric body weight control with ambulation re-education including up and down stairs     Home Exercise Program:    [x] (32788) Reviewed/Progressed HEP activities related to strengthening, flexibility, endurance, ROM of core, proximal hip and LE for functional self-care, mobility, lifting and ambulation/stair navigation   [] (23196)Reviewed/Progressed HEP activities related to improving balance, coordination, kinesthetic sense, posture, motor skill, proprioception of core, proximal hip and LE for self care, mobility, lifting, and ambulation/stair navigation      Manual Treatments:  PROM / STM / Oscillations-Mobs:  G-I, II, III, IV (PA's, Inf., Post.)  [x] (73107) Provided manual therapy to mobilize LE, proximal hip and/or LS spine soft tissue/joints for the purpose of modulating pain, promoting relaxation,  increasing ROM, reducing/eliminating soft tissue swelling/inflammation/restriction, improving soft tissue extensibility and allowing for proper ROM for normal function with self care, mobility, lifting and ambulation.      Modalities:  ionto 3.5mA 2mL=13' + 5' set-up/education   [x] GAME READY (VASO)- for significant edema, swelling, pain control. (med comp)  Charges:  Timed Code Treatment Minutes: 58   Total Treatment Minutes: 63 (5 min ionto set-up)     [facility] time in/time out:   (and requires time in and out for each CPT code)    [] EVAL (LOW) 57047 (typically 20 minutes face-to-face)  [] EVAL (MOD) 15010 (typically 30 minutes face-to-face)  [] EVAL (HIGH) 24493 (typically 45 minutes face-to-face)  [] RE-EVAL     [x] OF(97743) x1    [x] IONTO  [x] NMR (16541) x1     [] VASO  [x] Manual (37042) x 1    [] Other:  [] TA x      [] Mech Traction (04408)  [] ES(attended) (79376)      [] ES (un) (98166):       GOALS:   Therapist goals for Patient:   Short Term Goals: To be achieved in: 2 weeks  1. Independent in HEP and progression per patient tolerance, in order to prevent re-injury. [x] Progressing: [] Met: [] Not Met: [] Adjusted  2. Patient will have a decrease in pain to facilitate improvement in movement, function, and ADLs as indicated by Functional Deficits. [x] Progressing: [] Met: [] Not Met: [] Adjusted    Long Term Goals: To be achieved in: 6 weeks  1. Disability index score of 25% or less for the LEFS to assist with reaching prior level of function. [x] Progressing: [] Met: [] Not Met: [] Adjusted  2. Patient will demonstrate increased AROM to L ankle DF to +10 to allow for proper joint functioning as indicated by patients Functional Deficits. [x] Progressing: [] Met: [] Not Met: [] Adjusted  3. Patient will demonstrate an increase in Strength to good proximal hip strength and control, within 5lb HHD in LE to allow for proper functional mobility as indicated by patients Functional Deficits. [x] Progressing: [] Met: [] Not Met: [] Adjusted  4. Patient will return to being able to ambulate, ascend and descend stairs   without increased symptoms or restriction. [] Progressing: [] Met: [x] Not Met: [] Adjusted  5. Pt able to return to work as      [x] Progressing: [] Met: [] Not Met: [] Adjusted    Progression Towards Functional goals:  [] Patient is progressing as expected towards functional goals listed. [x] Progression is slowed due to complexities listed. [] Progression has been slowed due to co-morbidities. [] Plan just implemented, too soon to assess goals progression  [] Other:         Overall Progression Towards Functional goals/ Treatment Progress Update:  [] Patient is progressing as expected towards functional goals listed.     [] Progression is slowed due to complexities/Impairments listed. [] Progression has been slowed due to co-morbidities. [x] Plan just implemented, too soon to assess goals progression <30days   [] Goals require adjustment due to lack of progress  [] Patient is not progressing as expected and requires additional follow up with physician  [] Other    Prognosis for POC: [x] Good [] Fair  [] Poor      Patient requires continued skilled intervention: [x] Yes  [] No    Treatment/Activity Tolerance:  [x] Patient able to complete treatment  [] Patient limited by fatigue  [] Patient limited by pain    [] Patient limited by other medical complications  [] Other:     ASSESSMENT: Pt's pain levels have improved, now more sore/pulling felt in heel than sharp pain with [address] She still has sharp pain with palpation at med calcaneal tubercle, therefore have added ionto tx and pt will be getting OTC Powerstep orthotics today at MD appt. Return to Play: (if applicable)   []  Stage 1: Intro to Strength   []  Stage 2: Return to Run and Strength   []  Stage 3: Return to Jump and Strength   []  Stage 4: Dynamic Strength and Agility   []  Stage 5: Sport Specific Training     []  Ready to Return to Play, Meets All Above Stages   []  Not Ready for Return to Sports   Comments:                               PLAN: OTC orthotics, ionto. [x] Continue per plan of care [] Alter current plan (see comments above)  [] Plan of care initiated [] Hold pending MD visit [] Discharge      Electronically signed by:  Shannen Amaral PT, DPT    Note: If patient does not return for scheduled/ recommended follow up visits, this note will serve as a discharge from care along with most recent update on progress. Access Code: [email or web address]   URL: [email or web address]   Date: 07/24/2020   Prepared by: Steve Clements     Exercises   Long Sitting Calf Stretch with Strap - 3 reps - 1 sets - 30 hold - 2x daily - 7x weekly   Long Sitting Soleus Stretch on Bolster with Strap - 3 reps - 1 sets - 30 hold - 2x daily - 7x weekly   Seated Plantar Fascia Stretch - 5 reps - 1 sets - 10 hold - 2x daily - 7x weekly   Seated Arch Lifts - 10 reps - 1 sets - 5 hold - 2x daily - 7x weekly   Long Sitting Ankle Eversion with Resistance - 10 reps - 2 sets - 1x daily - 7x weekly   Long Sitting Ankle Inversion with Resistance - 10 reps - 2 sets - 1x daily - 7x weekly

## 2020-08-10 ENCOUNTER — HOSPITAL ENCOUNTER (OUTPATIENT)
Dept: PHYSICAL THERAPY | Age: 60
Setting detail: THERAPIES SERIES
Discharge: HOME | End: 2020-08-10
Payer: COMMERCIAL

## 2020-08-10 PROCEDURE — 97140 MANUAL THERAPY 1/> REGIONS: CPT

## 2020-08-10 RX ORDER — DEXAMETHASONE SODIUM PHOSPHATE 4 MG/ML
4 INJECTION, SOLUTION INTRA-ARTICULAR; INTRALESIONAL; INTRAMUSCULAR; INTRAVENOUS; SOFT TISSUE PRN
Qty: 40 ML | Refills: 0 | Status: SHIPPED | OUTPATIENT
Start: 2020-08-10

## 2020-08-10 RX ORDER — DEXAMETHASONE SODIUM PHOSPHATE 4 MG/ML
4 INJECTION, SOLUTION INTRA-ARTICULAR; INTRALESIONAL; INTRAMUSCULAR; INTRAVENOUS; SOFT TISSUE SEE ADMIN INSTRUCTIONS
Qty: 40 ML | Refills: 0 | Status: SHIPPED
Start: 2020-08-10 | End: 2020-08-10 | Stop reason: CLARIF

## 2020-08-10 NOTE — FLOWSHEET NOTE
[facility],  [address] [place]  Phone: [phone]  Fax [phone]    Physical Therapy Treatment Note/ Progress Report:           Date:  8/10/2020    Patient Name:  William Handler    :  1960  MRN: [id number]  Restrictions/Precautions:    Medical/Treatment Diagnosis Information:  Diagnosis: L foot pain, plantarfasciitis M79.672, M72.2  Treatment Diagnosis: M79.672 L foot pain  Insurance/Certification information:  PT Insurance Information: [facility]  Physician Information:  Referring Practitioner: Dr. Sarah Atwood  Has the plan of care been signed (Y/N):        []  Yes  [x]  No     Date of Patient follow up with Physician: 2020      Is this a Progress Report:     []  Yes  [x]  No        If Yes:  Date Range for reporting period:  Beginning 2020  Ending    Progress report will be due (10 Rx or 30 days whichever is less):        Recertification will be due (POC Duration  / 90 days whichever is less):  2020      Visit # Insurance Allowable Requires auth   6 30    []no        []yes:     Functional Scale: LEFS 51%   Date assessed:  2020      Therapy Diagnosis/Practice Pattern: 4E       Number of Comorbidities:  []0     [x]1-2    []3+    Latex Allergy:  [x]NO      []YES  Preferred Language for Healthcare:   [x]English       []other:      Pain level:  5/10     SUBJECTIVE:  Pt reports that she felt fine from ionto treatment last session and no skin irritation. However, she went back to pharmacy and again was only given a 1mL vial of dex. She is feeling frustrated that something keeps going wrong with her prescription and she is hoping I'm able to sort it out. She also reports that she did a lot of yard work yesterday that including working on a hillside for several hours and she knows this definitely irritated her foot.  She also says the orthotics still make foot sore and after further discussion discover she forgot about the recommendation to wean into them and she's just been wearing them all day long. OBJECTIVE:    Observation: still TTP med calc tubercle and proximal PF   Test measurements:      RESTRICTIONS/PRECAUTIONS: hx of r prox tib-fib fx; hx of parathyroid cancer     Exercises/Interventions:     Therapeutic Ex (34327) Sets/sec Reps Notes/CUES   Pt ed: discussed foot structure, has adequate flexibility but needs arch support for more stability in her shoes (she got new gym shoe with neutral footbed but could benefit from OTC orthotic in them.) Rec powerstep vs superfeet, but also can wait to see MD if wants to go through DME. Also asked pt to purchase foot rubz ball for self MFR. Discussed options for ionto if pain persists-can ask MD at visit next week.       Standing gastroc and soleus stretch with towel roll for arch support 3x30\" ea Hep    Sitting PF stretch   Hep    Sitting doming exercise   Toe yoga-in standing today  Hep         Rolling to L foot in sitting   Hep    Ankle inv, ev (heel anchored)  GVL, HEP   Sitting PF eccentrics on 1/2 FR  No weight on knee yet-sore in heel   Soleus press  30#   AP rock on 1/2 FR (roll down)   standing               Pt edu: weaning into orthotic 3'     Manual Intervention (77538)      STM/IASTM L calf and PF-  30'     TCJ mob post GIII     Toe ext stretch, PF stretch                       NMR re-education (94990)   CUES NEEDED   BAPS AP, ML, circles clockwise, ccw  lvl 2 standing, cues for FHB   SLS with cue for toes/arch and pelvis     SLS on Airex  \" \" \"  Hip abd                                          Therapeutic Activity (16680)                                                Therapeutic Exercise and NMR EXR  [] (46327) Provided verbal/tactile cueing for activities related to strengthening, flexibility, endurance, ROM for improvements in LE, proximal hip, and core control with self care, mobility, lifting, ambulation.  [] (46062) Provided verbal/tactile cueing for activities related to improving balance, coordination, kinesthetic sense, posture, motor skill, proprioception  to assist with LE, proximal hip, and core control in self care, mobility, lifting, ambulation and eccentric single leg control. NMR and Therapeutic Activities:    [] (24530 or 30053) Provided verbal/tactile cueing for activities related to improving balance, coordination, kinesthetic sense, posture, motor skill, proprioception and motor activation to allow for proper function of core, proximal hip and LE with self care and ADLs  [] (78601) Gait Re-education- Provided training and instruction to the patient for proper LE, core and proximal hip recruitment and positioning and eccentric body weight control with ambulation re-education including up and down stairs     Home Exercise Program:    [] (77560) Reviewed/Progressed HEP activities related to strengthening, flexibility, endurance, ROM of core, proximal hip and LE for functional self-care, mobility, lifting and ambulation/stair navigation   [] (68399)Reviewed/Progressed HEP activities related to improving balance, coordination, kinesthetic sense, posture, motor skill, proprioception of core, proximal hip and LE for self care, mobility, lifting, and ambulation/stair navigation      Manual Treatments:  PROM / STM / Oscillations-Mobs:  G-I, II, III, IV (PA's, Inf., Post.)  [x] (60963) Provided manual therapy to mobilize LE, proximal hip and/or LS spine soft tissue/joints for the purpose of modulating pain, promoting relaxation,  increasing ROM, reducing/eliminating soft tissue swelling/inflammation/restriction, improving soft tissue extensibility and allowing for proper ROM for normal function with self care, mobility, lifting and ambulation.      Modalities:  ionto 3.5mA 2mL=13' + 5' set-up/education   [] GAME READY (VASO)- for significant edema, swelling, pain control. (med comp)  Charges:  Timed Code Treatment Minutes: 30 Functional goals:  [] Patient is progressing as expected towards functional goals listed. [x] Progression is slowed due to complexities listed. [] Progression has been slowed due to co-morbidities. [] Plan just implemented, too soon to assess goals progression  [] Other:         Overall Progression Towards Functional goals/ Treatment Progress Update:  [] Patient is progressing as expected towards functional goals listed. [] Progression is slowed due to complexities/Impairments listed. [] Progression has been slowed due to co-morbidities. [x] Plan just implemented, too soon to assess goals progression <30days   [] Goals require adjustment due to lack of progress  [] Patient is not progressing as expected and requires additional follow up with physician  [] Other    Prognosis for POC: [x] Good [] Fair  [] Poor      Patient requires continued skilled intervention: [x] Yes  [] No    Treatment/Activity Tolerance:  [x] Patient able to complete treatment  [] Patient limited by fatigue  [] Patient limited by pain    [] Patient limited by other medical complications  [] Other:     ASSESSMENT: I unfortunately spent about 15 min of pt's appointment communicating back and forth with her physician's office group to clear up confusion behind her prescription with dexamethasone amount being dispensed. Since she only had 1mL today we were not able to do the treatment today but think that her pharmacy prescription should be clear now for an accurate amount to be dispensed. She did feel improved flexibility including with more freedom of motion during terminal stance of gait after the STM/MFR today. She also has a better understanding of weaning into her orthotics to improve tolerance.      Return to Play: (if applicable)   []  Stage 1: Intro to Strength   []  Stage 2: Return to Run and Strength   []  Stage 3: Return to Jump and Strength   []  Stage 4: Dynamic Strength and Agility   []  Stage 5: Sport Specific Training     []  Ready to Return to Play, Meets All Above Stages   []  Not Ready for Return to Sports   Comments:                               PLAN: Next visit: see if ionto prescription now cleared up with accurate amount being dispensed. Ionto, consider backwards walking, progress per POC. [x] Continue per plan of care [] Alter current plan (see comments above)  [] Plan of care initiated [] Hold pending MD visit [] Discharge      Electronically signed by:  Jabier Cueva PT, DPT    Note: If patient does not return for scheduled/ recommended follow up visits, this note will serve as a discharge from care along with most recent update on progress. Access Code: [email or web address]   URL: [email or web address]   Date: 07/24/2020   Prepared by: Domenica Alfonso     Exercises   Long Sitting Calf Stretch with Strap - 3 reps - 1 sets - 30 hold - 2x daily - 7x weekly   Long Sitting Soleus Stretch on Bolster with Strap - 3 reps - 1 sets - 30 hold - 2x daily - 7x weekly   Seated Plantar Fascia Stretch - 5 reps - 1 sets - 10 hold - 2x daily - 7x weekly   Seated Arch Lifts - 10 reps - 1 sets - 5 hold - 2x daily - 7x weekly   Long Sitting Ankle Eversion with Resistance - 10 reps - 2 sets - 1x daily - 7x weekly   Long Sitting Ankle Inversion with Resistance - 10 reps - 2 sets - 1x daily - 7x weekly

## 2020-08-14 ENCOUNTER — HOSPITAL ENCOUNTER (OUTPATIENT)
Dept: PHYSICAL THERAPY | Age: 60
Setting detail: THERAPIES SERIES
Discharge: HOME | End: 2020-08-14
Payer: COMMERCIAL

## 2020-08-14 PROCEDURE — 97140 MANUAL THERAPY 1/> REGIONS: CPT

## 2020-08-14 PROCEDURE — 97112 NEUROMUSCULAR REEDUCATION: CPT

## 2020-08-14 PROCEDURE — 97033 APP MDLTY 1+IONTPHRSIS EA 15: CPT

## 2020-08-14 NOTE — FLOWSHEET NOTE
[facility],  [address]  Phone: [phone]  Fax [phone]    Physical Therapy Treatment Note/ Progress Report:           Date:  2020    Patient Name:  Corinne Alegre    :  1960  MRN: [id number]  Restrictions/Precautions:    Medical/Treatment Diagnosis Information:  Diagnosis: L foot pain, plantarfasciitis M79.672, M72.2  Treatment Diagnosis: M79.672 L foot pain  Insurance/Certification information:  PT Insurance Information: [facility]  Physician Information:  Referring Practitioner: Dr. Otoniel Perez  Has the plan of care been signed (Y/N):        []  Yes  [x]  No     Date of Patient follow up with Physician: 2020      Is this a Progress Report:     []  Yes  [x]  No        If Yes:  Date Range for reporting period:  Beginning 2020  Ending    Progress report will be due (10 Rx or 30 days whichever is less):        Recertification will be due (POC Duration  / 90 days whichever is less):  2020      Visit # Insurance Allowable Requires auth   7 30    []no        []yes:     Functional Scale: LEFS 51%   Date assessed:  2020      Therapy Diagnosis/Practice Pattern: 4E       Number of Comorbidities:  []0     [x]1-2    []3+    Latex Allergy:  [x]NO      []YES  Preferred Language for Healthcare:   [x]English       []other:      Pain level:  10     SUBJECTIVE:  Pt just got back from long business trip and did more walking that usual with airports etc. It did get painfully painful the more the days added up with pain. She feels she is starting to recover on her own some. Pt says her foot actually felt pretty good after our manual work. She also was finally able to get the accurate ionto prescription from her pharmacy.     OBJECTIVE:    Observation: still TTP med calc tubercle and proximal PF   Test measurements:      RESTRICTIONS/PRECAUTIONS: hx of r prox tib-fib fx; hx of parathyroid cancer     Exercises/Interventions:     Therapeutic Ex (27495) Sets/sec Reps Notes/CUES   Pt ed: discussed foot structure, has adequate flexibility but needs arch support for more stability in her shoes (she got new gym shoe with neutral footbed but could benefit from OTC orthotic in them.) Rec powerstep vs superfeet, but also can wait to see MD if wants to go through DME. Also asked pt to purchase foot rubz ball for self MFR. Discussed options for ionto if pain persists-can ask MD at visit next week. Standing gastroc and soleus stretch with towel roll for arch support 3x30\" ea Hep    Sitting PF stretch   Hep    Sitting doming exercise   Toe yoga-in standing today  Hep         Rolling to L foot in sitting   Hep    Ankle inv, ev (heel anchored)  GVL, HEP   Sitting PF eccentrics on 1/2 FR  No weight on knee yet-sore in heel   Soleus press  30#   AP rock on 1/2 FR (roll down)   standing               Pt edu: weaning into orthotic      Manual Intervention (09313)      STM/IASTM L calf and PF-  20'     TCJ mob post GIII     Toe ext stretch, PF stretch                       NMR re-education (24294)   CUES NEEDED   BAPS AP, ML, circles clockwise, ccw 20xea lvl 2 standing, cues for FHB   SLS with cue for toes/arch and pelvis     SLS on Airex  \" \" \"  Hip abd 2x30\"  2x30\"                                         Therapeutic Activity (53575)                              Modalities      Ionto STAT patch 8 min application and edu  1CC, 4-6 hr wear         Therapeutic Exercise and NMR EXR  [] (94662) Provided verbal/tactile cueing for activities related to strengthening, flexibility, endurance, ROM for improvements in LE, proximal hip, and core control with self care, mobility, lifting, ambulation.   [x] (87357) Provided verbal/tactile cueing for activities related to improving balance, coordination, kinesthetic sense, posture, motor skill, proprioception  to assist with LE, proximal hip, and core control in self care, mobility, lifting, ambulation and eccentric single leg control. NMR and Therapeutic Activities:    [] (83022 or 61917) Provided verbal/tactile cueing for activities related to improving balance, coordination, kinesthetic sense, posture, motor skill, proprioception and motor activation to allow for proper function of core, proximal hip and LE with self care and ADLs  [] (30023) Gait Re-education- Provided training and instruction to the patient for proper LE, core and proximal hip recruitment and positioning and eccentric body weight control with ambulation re-education including up and down stairs     Home Exercise Program:    [] (37524) Reviewed/Progressed HEP activities related to strengthening, flexibility, endurance, ROM of core, proximal hip and LE for functional self-care, mobility, lifting and ambulation/stair navigation   [] (34743)Reviewed/Progressed HEP activities related to improving balance, coordination, kinesthetic sense, posture, motor skill, proprioception of core, proximal hip and LE for self care, mobility, lifting, and ambulation/stair navigation      Manual Treatments:  PROM / STM / Oscillations-Mobs:  G-I, II, III, IV (PA's, Inf., Post.)  [x] (19719) Provided manual therapy to mobilize LE, proximal hip and/or LS spine soft tissue/joints for the purpose of modulating pain, promoting relaxation,  increasing ROM, reducing/eliminating soft tissue swelling/inflammation/restriction, improving soft tissue extensibility and allowing for proper ROM for normal function with self care, mobility, lifting and ambulation.      Modalities:  ionto  ionto STAT patch 1CC 4-6 hr wear time   [] GAME READY (VASO)- for significant edema, swelling, pain control. (med comp)  Charges:  Timed Code Treatment Minutes: 50   Total Treatment Minutes: 50       B[facility] time in/time out:   (and requires time in and out for each CPT code)    [] EVAL (LOW) 638 [id number] (typically 20 minutes face-to-face)  [] EVAL (MOD) 35701 Overall Progression Towards Functional goals/ Treatment Progress Update:  [] Patient is progressing as expected towards functional goals listed. [] Progression is slowed due to complexities/Impairments listed. [] Progression has been slowed due to co-morbidities. [x] Plan just implemented, too soon to assess goals progression <30days   [] Goals require adjustment due to lack of progress  [] Patient is not progressing as expected and requires additional follow up with physician  [] Other    Prognosis for POC: [x] Good [] Fair  [] Poor      Patient requires continued skilled intervention: [x] Yes  [] No    Treatment/Activity Tolerance:  [x] Patient able to complete treatment  [] Patient limited by fatigue  [] Patient limited by pain    [] Patient limited by other medical complications  [] Other:     ASSESSMENT: Pt again felt the STM/MFR helped reduced her pain that she walked in with however the WB exercises particularly the BAPS increased her pain again slightly. Pt was educated on the intention of ionto patch application,wear time, removal, and risk for possible skin irritation. Return to Play: (if applicable)   []  Stage 1: Intro to Strength   []  Stage 2: Return to Run and Strength   []  Stage 3: Return to Jump and Strength   []  Stage 4: Dynamic Strength and Agility   []  Stage 5: Sport Specific Training     []  Ready to Return to Play, Meets All Above Stages   []  Not Ready for Return to Sports   Comments:                               PLAN: Next visit: [place], consider backwards walking, progress per POC. [x] Continue per plan of care [] Alter current plan (see comments above)  [] Plan of care initiated [] Hold pending MD visit [] Discharge      Electronically signed by:  Dusty Baltazar, PT, DPT    Note: If patient does not return for scheduled/ recommended follow up visits, this note will serve as a discharge from care along with most recent update on progress.      Access Code: [email or web address]   URL: [email or web address]   Date: 07/24/2020   Prepared by: Julia Mishicot     Exercises   Long Sitting Calf Stretch with Strap - 3 reps - 1 sets - 30 hold - 2x daily - 7x weekly   Long Sitting Soleus Stretch on Bolster with Strap - 3 reps - 1 sets - 30 hold - 2x daily - 7x weekly   Seated Plantar Fascia Stretch - 5 reps - 1 sets - 10 hold - 2x daily - 7x weekly   Seated Arch Lifts - 10 reps - 1 sets - 5 hold - 2x daily - 7x weekly   Long Sitting Ankle Eversion with Resistance - 10 reps - 2 sets - 1x daily - 7x weekly   Long Sitting Ankle Inversion with Resistance - 10 reps - 2 sets - 1x daily - 7x weekly

## 2020-08-18 ENCOUNTER — HOSPITAL ENCOUNTER (OUTPATIENT)
Dept: PHYSICAL THERAPY | Age: 60
Setting detail: THERAPIES SERIES
Discharge: HOME | End: 2020-08-18
Payer: COMMERCIAL

## 2020-08-18 PROCEDURE — 97110 THERAPEUTIC EXERCISES: CPT

## 2020-08-18 PROCEDURE — 97140 MANUAL THERAPY 1/> REGIONS: CPT

## 2020-08-18 PROCEDURE — 97112 NEUROMUSCULAR REEDUCATION: CPT

## 2020-08-18 PROCEDURE — 97033 APP MDLTY 1+IONTPHRSIS EA 15: CPT

## 2020-08-18 NOTE — FLOWSHEET NOTE
[facility],  [address]  Phone: [phone]  Fax [phone]    Physical Therapy Treatment Note/ Progress Report:           Date:  2020    Patient Name:  Lorenzo Chávez    :  1960  MRN: [id number]  Restrictions/Precautions:    Medical/Treatment Diagnosis Information:  Diagnosis: L foot pain, plantarfasciitis M79.672, M72.2  Treatment Diagnosis: M79.672 L foot pain  Insurance/Certification information:  PT Insurance Information: [facility]  Physician Information:  Referring Practitioner: Dr. Annabelle Luna  Has the plan of care been signed (Y/N):        []  Yes  [x]  No     Date of Patient follow up with Physician: 2020      Is this a Progress Report:     []  Yes  [x]  No        If Yes:  Date Range for reporting period:  Beginning 2020  Ending    Progress report will be due (10 Rx or 30 days whichever is less):        Recertification will be due (POC Duration  / 90 days whichever is less):  2020      Visit # Insurance Allowable Requires auth   8 30    []no        []yes:     Functional Scale: LEFS 51%   Date assessed:  2020      Therapy Diagnosis/Practice Pattern: 4E       Number of Comorbidities:  []0     [x]1-2    []3+    Latex Allergy:  [x]NO      []YES  Preferred Language for Healthcare:   [x]English       []other:      Pain level:  4/10     SUBJECTIVE:  Pt feels that her pain levels are lower for ~3/4 of the day, but she still has very achy heel and arch pain by the end of the day. She has 3 pairs of new shoes that she rotates throughout her work schedule.  She's still wearing her Powerstep orthotics (she's breaking them in more slowly now.)    OBJECTIVE:    Observation: still TTP med calc tubercle and proximal PF   Test measurements:      RESTRICTIONS/PRECAUTIONS: hx of r prox tib-fib fx; hx of parathyroid cancer     Exercises/Interventions:     Therapeutic Ex (07894) mobility, lifting, ambulation and eccentric single leg control. NMR and Therapeutic Activities:    [] (79957 or 58719) Provided verbal/tactile cueing for activities related to improving balance, coordination, kinesthetic sense, posture, motor skill, proprioception and motor activation to allow for proper function of core, proximal hip and LE with self care and ADLs  [] (80632) Gait Re-education- Provided training and instruction to the patient for proper LE, core and proximal hip recruitment and positioning and eccentric body weight control with ambulation re-education including up and down stairs     Home Exercise Program:    [] (38182) Reviewed/Progressed HEP activities related to strengthening, flexibility, endurance, ROM of core, proximal hip and LE for functional self-care, mobility, lifting and ambulation/stair navigation   [] (52719)Reviewed/Progressed HEP activities related to improving balance, coordination, kinesthetic sense, posture, motor skill, proprioception of core, proximal hip and LE for self care, mobility, lifting, and ambulation/stair navigation      Manual Treatments:  PROM / STM / Oscillations-Mobs:  G-I, II, III, IV (PA's, Inf., Post.)  [x] (00049) Provided manual therapy to mobilize LE, proximal hip and/or LS spine soft tissue/joints for the purpose of modulating pain, promoting relaxation,  increasing ROM, reducing/eliminating soft tissue swelling/inflammation/restriction, improving soft tissue extensibility and allowing for proper ROM for normal function with self care, mobility, lifting and ambulation.      Modalities:  ionto 4.0mA 2mL=10' + 5' set-up/education i [] GAME READY (VASO)- for significant edema, swelling, pain control. (med comp)  Charges:  Timed Code Treatment Minutes: 61   Total Treatment Minutes: 61       [facility] time in/time out:   (and requires time in and out for each CPT code)    [] EVAL (LOW) 38747 (typically 20 minutes face-to-face)  [] EVAL (MOD) 06505 (typically 30 minutes face-to-face)  [] EVAL (HIGH) 22588 (typically 45 minutes face-to-face)  [] RE-EVAL     [x] KQ(53291) x1   [x] IONTO  [x] NMR (20105) x 1    [] VASO  [x] Manual (45292) x 1   [] Other:  [] TA x      [] Mech Traction (98390)  [] ES(attended) (28270)      [] ES (un) (85934):       GOALS:   Therapist goals for Patient:   Short Term Goals: To be achieved in: 2 weeks  1. Independent in HEP and progression per patient tolerance, in order to prevent re-injury. [x] Progressing: [] Met: [] Not Met: [] Adjusted  2. Patient will have a decrease in pain to facilitate improvement in movement, function, and ADLs as indicated by Functional Deficits. [x] Progressing: [] Met: [] Not Met: [] Adjusted    Long Term Goals: To be achieved in: 6 weeks  1. Disability index score of 25% or less for the LEFS to assist with reaching prior level of function. [x] Progressing: [] Met: [] Not Met: [] Adjusted  2. Patient will demonstrate increased AROM to L ankle DF to +10 to allow for proper joint functioning as indicated by patients Functional Deficits. [x] Progressing: [] Met: [] Not Met: [] Adjusted  3. Patient will demonstrate an increase in Strength to good proximal hip strength and control, within 5lb HHD in LE to allow for proper functional mobility as indicated by patients Functional Deficits. [x] Progressing: [] Met: [] Not Met: [] Adjusted  4. Patient will return to being able to ambulate, ascend and descend stairs   without increased symptoms or restriction. [] Progressing: [] Met: [x] Not Met: [] Adjusted  5. Pt able to return to work as      [x] Progressing: [] Met: [] Not Met: [] Adjusted    Progression Towards Functional goals:  [] Patient is progressing as expected towards functional goals listed. [x] Progression is slowed due to complexities listed. [] Progression has been slowed due to co-morbidities.   [] Plan just implemented, too soon to assess goals progression  [] Other:         Overall Progression Towards Functional goals/ Treatment Progress Update:  [] Patient is progressing as expected towards functional goals listed. [] Progression is slowed due to complexities/Impairments listed. [] Progression has been slowed due to co-morbidities. [x] Plan just implemented, too soon to assess goals progression <30days   [] Goals require adjustment due to lack of progress  [] Patient is not progressing as expected and requires additional follow up with physician  [] Other    Prognosis for POC: [x] Good [] Fair  [] Poor      Patient requires continued skilled intervention: [x] Yes  [] No    Treatment/Activity Tolerance:  [x] Patient able to complete treatment  [] Patient limited by fatigue  [] Patient limited by pain    [] Patient limited by other medical complications  [] Other:     ASSESSMENT: Pt feels she's making progress with manual tx, ionto, and better shoe wear. She is still not able to janeen full day in her orthotics, but pain levels are remaining low to moderate by the end of the day. D/t fatigue after standing BAPS and Airex, did not at CC walking today. She has had no skin irritation from use of ionto. Return to Play: (if applicable)   []  Stage 1: Intro to Strength   []  Stage 2: Return to Run and Strength   []  Stage 3: Return to Jump and Strength   []  Stage 4: Dynamic Strength and Agility   []  Stage 5: Sport Specific Training     []  Ready to Return to Play, Meets All Above Stages   []  Not Ready for Return to Sports   Comments:                               PLAN: Next visit: Ionto, consider backwards walking, progress per POC.    [x] Continue per plan of care [x] Alter current plan (see comments above)  [] Plan of care initiated [] Hold pending MD visit [] Discharge      Electronically signed by:  Bayard Lundborg, PT, DPT    Note: If patient does not return for scheduled/ recommended follow up visits, this note will serve as a discharge from care along with most recent update on progress. Access Code: [email or web address]   URL: [email or web address]   Date: 07/24/2020   Prepared by: Beth Lundberg     Exercises   Long Sitting Calf Stretch with Strap - 3 reps - 1 sets - 30 hold - 2x daily - 7x weekly   Long Sitting Soleus Stretch on Bolster with Strap - 3 reps - 1 sets - 30 hold - 2x daily - 7x weekly   Seated Plantar Fascia Stretch - 5 reps - 1 sets - 10 hold - 2x daily - 7x weekly   Seated Arch Lifts - 10 reps - 1 sets - 5 hold - 2x daily - 7x weekly   Long Sitting Ankle Eversion with Resistance - 10 reps - 2 sets - 1x daily - 7x weekly   Long Sitting Ankle Inversion with Resistance - 10 reps - 2 sets - 1x daily - 7x weekly

## 2020-08-21 ENCOUNTER — HOSPITAL ENCOUNTER (OUTPATIENT)
Dept: PHYSICAL THERAPY | Age: 60
Setting detail: THERAPIES SERIES
Discharge: HOME | End: 2020-08-21
Payer: COMMERCIAL

## 2020-08-21 PROCEDURE — 97112 NEUROMUSCULAR REEDUCATION: CPT

## 2020-08-21 PROCEDURE — 97033 APP MDLTY 1+IONTPHRSIS EA 15: CPT

## 2020-08-21 PROCEDURE — 97140 MANUAL THERAPY 1/> REGIONS: CPT

## 2020-08-21 PROCEDURE — 97110 THERAPEUTIC EXERCISES: CPT

## 2020-08-21 NOTE — FLOWSHEET NOTE
shoe with neutral footbed but could benefit from OTC orthotic in them.) Rec powerstep vs superfeet, but also can wait to see MD if wants to go through DME. Also asked pt to purchase foot rubz ball for self MFR. Discussed options for ionto if pain persists-can ask MD at visit next week. Standing gastroc and soleus (incline board now that she has orthotics) 3x30\" ea Hep    Sitting PF stretch   Hep    Sitting doming exercise   Toe yoga-in standing today  Hep         Rolling to L foot in sitting  3 min  Green theratube, Hep    Ankle inv, ev (heel anchored)  GVL, HEP   Sitting PF eccentrics on 1/2 FR  No weight on knee yet-sore in heel   Soleus press 3x10 30#   AP rock on 1/2 FR (roll down)  3' standing               Pt edu: weaning into orthotic   Verbal review   Manual Intervention ([id number])      STM/IASTM L calf and PF-  15'     TCJ mob post GIII     Toe ext stretch, PF stretch                       NMR re-education (12317)   CUES NEEDED   BAPS AP, ML, circles clockwise, ccw 20xea lvl 2 standing, cues for FHB   SLS with cue for toes/arch and pelvis     SLS on Airex  \" \" \"  With 3 D LE reach 2x30\"  x10 trips R,  L stance     Tandem balance on 1/2 FR 1' R, L leading     Tandem balance on Airex 1' R, L  Cue for 1st ray   Walk in place on Airex (attempted on BOSU, too dicfficult) 1'                       Therapeutic Activity (88142)                              Modalities      Ionto STAT patch            Therapeutic Exercise and NMR EXR  [x] (13685) Provided verbal/tactile cueing for activities related to strengthening, flexibility, endurance, ROM for improvements in LE, proximal hip, and core control with self care, mobility, lifting, ambulation.   [x] (98515) Provided verbal/tactile cueing for activities related to improving balance, coordination, kinesthetic sense, posture, motor skill, proprioception  to assist with LE, proximal hip, and core control in self care, mobility, lifting, ambulation and eccentric single leg control. NMR and Therapeutic Activities:    [x] (67705 or 48528) Provided verbal/tactile cueing for activities related to improving balance, coordination, kinesthetic sense, posture, motor skill, proprioception and motor activation to allow for proper function of core, proximal hip and LE with self care and ADLs  [] (15185) Gait Re-education- Provided training and instruction to the patient for proper LE, core and proximal hip recruitment and positioning and eccentric body weight control with ambulation re-education including up and down stairs     Home Exercise Program:    [] (38985) Reviewed/Progressed HEP activities related to strengthening, flexibility, endurance, ROM of core, proximal hip and LE for functional self-care, mobility, lifting and ambulation/stair navigation   [] (09885)Reviewed/Progressed HEP activities related to improving balance, coordination, kinesthetic sense, posture, motor skill, proprioception of core, proximal hip and LE for self care, mobility, lifting, and ambulation/stair navigation      Manual Treatments:  PROM / STM / Oscillations-Mobs:  G-I, II, III, IV (PA's, Inf., Post.)  [x] (07426) Provided manual therapy to mobilize LE, proximal hip and/or LS spine soft tissue/joints for the purpose of modulating pain, promoting relaxation,  increasing ROM, reducing/eliminating soft tissue swelling/inflammation/restriction, improving soft tissue extensibility and allowing for proper ROM for normal function with self care, mobility, lifting and ambulation.       Modalities:  ionto 4.0mA 2mL=10' + 5' set-up/education i [] GAME READY (VASO)- for significant edema, swelling, pain control. (med comp)  Charges:  Timed Code Treatment Minutes: 60   Total Treatment Minutes: 60       B[facility] time in/time out:   (and requires time in and out for each CPT code)    [] EVAL (LOW) 55533 (typically 20 minutes face-to-face)  [] EVAL (MOD) 41504 (typically 30 minutes face-to-face)  [] EVAL (HIGH) 26333

## 2020-08-26 ENCOUNTER — HOSPITAL ENCOUNTER (OUTPATIENT)
Dept: PHYSICAL THERAPY | Age: 60
Setting detail: THERAPIES SERIES
Discharge: HOME | End: 2020-08-26
Payer: COMMERCIAL

## 2020-08-26 PROCEDURE — 97140 MANUAL THERAPY 1/> REGIONS: CPT

## 2020-08-26 PROCEDURE — 97033 APP MDLTY 1+IONTPHRSIS EA 15: CPT

## 2020-08-26 PROCEDURE — 97112 NEUROMUSCULAR REEDUCATION: CPT

## 2020-08-26 NOTE — PROGRESS NOTES
[facility],  [address] [place]  Phone: [phone]  Fax [phone]    Physical Therapy Treatment Note/ Progress Report:           Date:  2020    Patient Name:  Lorenzo Chávez    :  1960  MRN: [id number]  Restrictions/Precautions:    Medical/Treatment Diagnosis Information:  Diagnosis: L foot pain, plantarfasciitis M79.672, M72.2  Treatment Diagnosis: M79.672 L foot pain  Insurance/Certification information:  PT Insurance Information: [facility]  Physician Information:  Referring Practitioner: Dr. Annabelle Luna  Has the plan of care been signed (Y/N):        []  Yes  [x]  No     Date of Patient follow up with Physician: 2020      Is this a Progress Report:     [x]  Yes  []  No        If Yes:  Date Range for reporting period:  Beginning 2020  Ending 20  Progress report will be due (10 Rx or 30 days whichever is less): POC       Recertification will be due (POC Duration  / 90 days whichever is less):  2020      Visit # Insurance Allowable Requires auth   10-prog note  30    []no        []yes:     Functional Scale: LEFS 51%   Date assessed:  2020   LEFS 40% disability     20     Therapy Diagnosis/Practice Pattern: 4E       Number of Comorbidities:  []0     [x]1-2    []3+    Latex Allergy:  [x]NO      []YES  Preferred Language for Healthcare:   [x]English       []other:      Pain level:  2-710     SUBJECTIVE:  Pt states that she still has pain in her heel, but it is not as severe. She was able to wear heels on a 3 day trip and this is a big improvement. Her pain used to be severe all the time, but now it is not all the time.      OBJECTIVE:    Observation: still TTP med calc tubercle and proximal PF   Test measurements:   ROM LEFT RIGHT   HIP Flex       HIP Abd       HIP Ext       HIP IR       HIP ER       Knee ext       Knee Flex       Ankle PF 45 pain  60   Ankle DF +5 +10   Ankle In 35 40   Ankle Ev 25  25   Strength  LEFT RIGHT   HIP Flexors 4+ 4+   HIP Abductors       HIP Ext       Hip ER       Knee EXT (quad) 5 4+   Knee Flex (HS) 5 5   Ankle DF 5 5   Ankle PF 5 5   Ankle Inv 4+ 5   Ankle EV 4+ 5                   RESTRICTIONS/PRECAUTIONS: hx of r prox tib-fib fx; hx of parathyroid cancer     Exercises/Interventions:     Therapeutic Ex (80511) Sets/sec Reps Notes/CUES   Pt ed: discussed foot structure, has adequate flexibility but needs arch support for more stability in her shoes (she got new gym shoe with neutral footbed but could benefit from OTC orthotic in them.) Rec powerstep vs superfeet, but also can wait to see MD if wants to go through DME. Also asked pt to purchase foot rubz ball for self MFR. Discussed options for ionto if pain persists-can ask MD at visit next week.       Standing gastroc and soleus (incline board now that she has orthotics) 3x30\" ea Hep    Sitting PF stretch   Hep    Sitting doming exercise   Toe yoga-in standing today  Hep         Rolling to L foot in sitting  n  Green theratube, Hep    Ankle inv, ev (heel anchored)  GVL, HEP   Sitting PF eccentrics on 1/2 FR  No weight on knee yet-sore in heel   Soleus press  30#   AP rock on 1/2 FR (roll down)   standing   Standing HR's 2x10  Cues for even pressure on foot         Pt edu: weaning into orthotic   Verbal review   Manual Intervention (79098)      STM/IASTM L calf and PF-  15'     TCJ mob post GIII     Toe ext stretch, PF stretch                       NMR re-education (53660)   CUES NEEDED   BAPS AP, ML, circles clockwise, ccw 20xea lvl 2 standing, cues for FHB, sore afterward    SLS with cue for toes/arch and pelvis     SLS on Airex  \" \" \"  With 3 D LE reach 2x30\"  x10 trips R,  L stance     Tandem balance on 1/2 FR 1' R, L leading     Tandem balance on Airex 1' R, L  Cue for 1st ray   Walk in place on Airex (attempted on [facility], too dicfficult) 1'                       Therapeutic Activity (18856) Modalities      Ionto STAT patch            Therapeutic Exercise and NMR EXR  [x] (88980) Provided verbal/tactile cueing for activities related to strengthening, flexibility, endurance, ROM for improvements in LE, proximal hip, and core control with self care, mobility, lifting, ambulation. [x] (93094) Provided verbal/tactile cueing for activities related to improving balance, coordination, kinesthetic sense, posture, motor skill, proprioception  to assist with LE, proximal hip, and core control in self care, mobility, lifting, ambulation and eccentric single leg control.      NMR and Therapeutic Activities:    [x] (83588 or 75395) Provided verbal/tactile cueing for activities related to improving balance, coordination, kinesthetic sense, posture, motor skill, proprioception and motor activation to allow for proper function of core, proximal hip and LE with self care and ADLs  [] (25828) Gait Re-education- Provided training and instruction to the patient for proper LE, core and proximal hip recruitment and positioning and eccentric body weight control with ambulation re-education including up and down stairs     Home Exercise Program:    [] (46482) Reviewed/Progressed HEP activities related to strengthening, flexibility, endurance, ROM of core, proximal hip and LE for functional self-care, mobility, lifting and ambulation/stair navigation   [] (71247)Reviewed/Progressed HEP activities related to improving balance, coordination, kinesthetic sense, posture, motor skill, proprioception of core, proximal hip and LE for self care, mobility, lifting, and ambulation/stair navigation      Manual Treatments:  PROM / STM / Oscillations-Mobs:  G-I, II, III, IV (PA's, Inf., Post.)  [x] (82972) Provided manual therapy to mobilize LE, proximal hip and/or LS spine soft tissue/joints for the purpose of modulating pain, promoting relaxation,  increasing ROM, reducing/eliminating soft tissue swelling/inflammation/restriction, improving soft tissue extensibility and allowing for proper ROM for normal function with self care, mobility, lifting and ambulation. Modalities:  ionto 4.0mA 2mL=10' + 5' set-up/education i [] GAME READY (VASO)- for significant edema, swelling, pain control. (med comp)  Charges:  Timed Code Treatment Minutes: 55   Total Treatment Minutes: 60 (ionto set-up)       [facility] time in/time out:   (and requires time in and out for each CPT code)    [] EVAL (LOW) 99279 (typically 20 minutes face-to-face)  [] EVAL (MOD) 58649 (typically 30 minutes face-to-face)  [] EVAL (HIGH) 74489 (typically 45 minutes face-to-face)  [] RE-EVAL     [x] QQ(67647) x   [x] IONTO  [x] NMR (21995) x 2    [] VASO  [x] Manual (15313) x 1   [] Other:  [] TA x      [] Mech Traction (04161)  [] ES(attended) (30000)      [] ES (un) (29729):       GOALS:   Therapist goals for Patient:   Short Term Goals: To be achieved in: 2 weeks  1. Independent in HEP and progression per patient tolerance, in order to prevent re-injury. [x] Progressing: [] Met: [] Not Met: [] Adjusted  2. Patient will have a decrease in pain to facilitate improvement in movement, function, and ADLs as indicated by Functional Deficits. [x] Progressing: [] Met: [] Not Met: [] Adjusted    Long Term Goals: To be achieved in: 6 weeks  1. Disability index score of 25% or less for the LEFS to assist with reaching prior level of function. [x] Progressing: [] Met: [] Not Met: [] Adjusted  2. Patient will demonstrate increased AROM to L ankle DF to +10 to allow for proper joint functioning as indicated by patients Functional Deficits. [x] Progressing: [] Met: [] Not Met: [] Adjusted  3. Patient will demonstrate an increase in Strength to good proximal hip strength and control, within 5lb HHD in LE to allow for proper functional mobility as indicated by patients Functional Deficits. [x] Progressing: [] Met: [] Not Met: [] Adjusted  4.  Patient will return to being able to ambulate, ascend and descend stairs   without increased symptoms or restriction. [x] Progressing: [] Met: [] Not Met: [] Adjusted  5. Pt able to return to work as      [] Progressing: [x] Met: [] Not Met: [] Adjusted    Progression Towards Functional goals:  [x] Patient is progressing as expected towards functional goals listed. [] Progression is slowed due to complexities listed. [] Progression has been slowed due to co-morbidities. [] Plan just implemented, too soon to assess goals progression  [] Other:         Overall Progression Towards Functional goals/ Treatment Progress Update:  [x] Patient is progressing as expected towards functional goals listed. [] Progression is slowed due to complexities/Impairments listed. [] Progression has been slowed due to co-morbidities. [] Plan just implemented, too soon to assess goals progression <30days   [] Goals require adjustment due to lack of progress  [] Patient is not progressing as expected and requires additional follow up with physician  [] Other    Prognosis for POC: [x] Good [] Fair  [] Poor      Patient requires continued skilled intervention: [x] Yes  [] No    Treatment/Activity Tolerance:  [x] Patient able to complete treatment  [] Patient limited by fatigue  [] Patient limited by pain    [] Patient limited by other medical complications  [] Other:     ASSESSMENT: Pt is able to tolerate full work days and is not having pain with wearing heels at work now. She appears to be responding well to iontophoresis to reduce her pain. Her strength has improved in her ankle since IE, but struggles with balance on a compliant surface still. She will benefit from continued PT to reduce pain levels further and improve ankle proprioception.      Return to Play: (if applicable)   []  Stage 1: Intro to Strength   []  Stage 2: Return to Run and Strength   []  Stage 3: Return to Jump and Strength   []  Stage 4: Dynamic Strength and Agility   []  Stage 5: Sport Specific Training     []  Ready to Return to Play, Meets All Above Stages   []  Not Ready for Return to Sports   Comments:                               PLAN: Next visit: Ionto, consider backwards walking, progress per POC. [x] Continue per plan of care [x] Alter current plan (see comments above)  [] Plan of care initiated [] Hold pending MD visit [] Discharge      Electronically signed by:  Maryellen Norris, PT, DPT    Note: If patient does not return for scheduled/ recommended follow up visits, this note will serve as a discharge from care along with most recent update on progress. Access Code: [email or web address]   URL: [email or web address]   Date: 07/24/2020   Prepared by: Maryellen Norris     Exercises   Long Sitting Calf Stretch with Strap - 3 reps - 1 sets - 30 hold - 2x daily - 7x weekly   Long Sitting Soleus Stretch on Bolster with Strap - 3 reps - 1 sets - 30 hold - 2x daily - 7x weekly   Seated Plantar Fascia Stretch - 5 reps - 1 sets - 10 hold - 2x daily - 7x weekly   Seated Arch Lifts - 10 reps - 1 sets - 5 hold - 2x daily - 7x weekly   Long Sitting Ankle Eversion with Resistance - 10 reps - 2 sets - 1x daily - 7x weekly   Long Sitting Ankle Inversion with Resistance - 10 reps - 2 sets - 1x daily - 7x weekly

## 2020-08-28 ENCOUNTER — HOSPITAL ENCOUNTER (OUTPATIENT)
Dept: PHYSICAL THERAPY | Age: 60
Setting detail: THERAPIES SERIES
Discharge: HOME | End: 2020-08-28
Payer: COMMERCIAL

## 2020-08-28 PROCEDURE — 97112 NEUROMUSCULAR REEDUCATION: CPT

## 2020-08-28 PROCEDURE — 97140 MANUAL THERAPY 1/> REGIONS: CPT

## 2020-08-28 PROCEDURE — 97110 THERAPEUTIC EXERCISES: CPT

## 2020-08-28 PROCEDURE — 97033 APP MDLTY 1+IONTPHRSIS EA 15: CPT

## 2020-08-28 NOTE — FLOWSHEET NOTE
[facility],  [address]  Phone: [phone]  Fax [phone]    Physical Therapy Treatment Note/ Progress Report:           Date:  2020    Patient Name:  Marlen Ibrahim    :  1960  MRN: [id number]  Restrictions/Precautions:    Medical/Treatment Diagnosis Information:  Diagnosis: L foot pain, plantarfasciitis M79.672, M72.2  Treatment Diagnosis: M79.672 L foot pain  Insurance/Certification information:  PT Insurance Information: [facility]  Physician Information:  Referring Practitioner: Dr. Jillian Adair  Has the plan of care been signed (Y/N):        []  Yes  [x]  No     Date of Patient follow up with Physician: 2020      Is this a Progress Report:     [x]  Yes  []  No        If Yes:  Date Range for reporting period:  Beginning 2020  Ending 20  Progress report will be due (10 Rx or 30 days whichever is less): POC       Recertification will be due (POC Duration  / 90 days whichever is less):  2020      Visit # Insurance Allowable Requires auth   11  30    []no        []yes:     Functional Scale: LEFS 51%   Date assessed:  2020   LEFS 40% disability     20     Therapy Diagnosis/Practice Pattern: 4E       Number of Comorbidities:  []0     [x]1-2    []3+    Latex Allergy:  [x]NO      []YES  Preferred Language for Healthcare:   [x]English       []other:      Pain level:  3/10     SUBJECTIVE:  Pt is really noticing she can be on her feet longer with lower pain levels. She can tolerate wearing orthotics all day now.     OBJECTIVE:    Observation: still TTP med calc tubercle and proximal PF   Test measurements:   ROM LEFT RIGHT   HIP Flex       HIP Abd       HIP Ext       HIP IR       HIP ER       Knee ext       Knee Flex       Ankle PF 45 pain  60   Ankle DF +5 +10   Ankle In 35 40   Ankle Ev 25  25   Strength  LEFT RIGHT   HIP Flexors 4+ 4+   HIP Abductors       HIP Ext     Hip ER       Knee EXT (quad) 5 4+   Knee Flex (HS) 5 5   Ankle DF 5 5   Ankle PF 5 5   Ankle Inv 4+ 5   Ankle EV 4+ 5                   RESTRICTIONS/PRECAUTIONS: hx of r prox tib-fib fx; hx of parathyroid cancer     Exercises/Interventions:     Therapeutic Ex (74457) Sets/sec Reps Notes/CUES   Pt ed: discussed foot structure, has adequate flexibility but needs arch support for more stability in her shoes (she got new gym shoe with neutral footbed but could benefit from OTC orthotic in them.) Rec powerstep vs superfeet, but also can wait to see MD if wants to go through DME. Also asked pt to purchase foot rubz ball for self MFR. Discussed options for ionto if pain persists-can ask MD at visit next week.       Standing gastroc and soleus (incline board now that she has orthotics) 3x30\" ea Hep    Sitting PF stretch   Hep    Sitting doming exercise   Toe yoga-in standing today 5\" x10  x10 qucik review for Hep         Rolling to L foot in sitting  3 min  Green theratube, Hep    Ankle inv, ev (heel anchored)  GVL, HEP   Sitting PF eccentrics on 1/2 FR  No weight on knee yet-sore in heel   Soleus press  30#   AP rock on 1/2 FR (roll down)   standing   MW retro MW with HR's x20 ea  Cues for even pressure on foot; Green tband at ankles         Pt edu: weaning into orthotic   Verbal review   Manual Intervention ([phone])      STM/IASTM L calf and PF-  15'     TCJ mob post GIII     Toe ext stretch, PF stretch                       NMR re-education (40997)   CUES NEEDED   BAPS AP, ML, circles clockwise, ccw  lvl 2 standing, cues for FHB, sore afterward    SLS with cue for toes/arch and pelvis     SLS on Airex  \" \" \"  With 3 D LE reach 2x30\"  x10 trips R,  L stance     Tandem balance on 1/2 FR 1' R, L leading     Tandem balance with UE chops 2# MB x20 R, L  Cue for 1st ray   Walk in place on DD  1'     4 way Resisted gait on CC with 3D LE tap last step 25# x5 trips ea     Balance board lat taps 2x50   UE supp         Therapeutic tissue swelling/inflammation/restriction, improving soft tissue extensibility and allowing for proper ROM for normal function with self care, mobility, lifting and ambulation. Modalities:  ionto 4.0mA 2mL=10' + 5' set-up/education i [] GAME READY (VASO)- for significant edema, swelling, pain control. (med comp)  Charges:  Timed Code Treatment Minutes: 60   Total Treatment Minutes: 65 (ionto set-up)       [facility] time in/time out:   (and requires time in and out for each CPT code)    [] EVAL (LOW) 51258 (typically 20 minutes face-to-face)  [] EVAL (MOD) 44042 (typically 30 minutes face-to-face)  [] EVAL (HIGH) 56039 (typically 45 minutes face-to-face)  [] RE-EVAL     [x] RU(64021) x 1  [x] IONTO  [x] NMR (93823) x 1    [] VASO  [x] Manual (98926) x 1   [] Other:  [] TA x      [] Mech Traction (12195)  [] ES(attended) (86704)      [] ES (un) (70042):       GOALS:   Therapist goals for Patient:   Short Term Goals: To be achieved in: 2 weeks  1. Independent in HEP and progression per patient tolerance, in order to prevent re-injury. [x] Progressing: [] Met: [] Not Met: [] Adjusted  2. Patient will have a decrease in pain to facilitate improvement in movement, function, and ADLs as indicated by Functional Deficits. [x] Progressing: [] Met: [] Not Met: [] Adjusted    Long Term Goals: To be achieved in: 6 weeks  1. Disability index score of 25% or less for the LEFS to assist with reaching prior level of function. [x] Progressing: [] Met: [] Not Met: [] Adjusted  2. Patient will demonstrate increased AROM to L ankle DF to +10 to allow for proper joint functioning as indicated by patients Functional Deficits. [x] Progressing: [] Met: [] Not Met: [] Adjusted  3. Patient will demonstrate an increase in Strength to good proximal hip strength and control, within 5lb HHD in LE to allow for proper functional mobility as indicated by patients Functional Deficits. [x] Progressing: [] Met: [] Not Met: [] Adjusted  4.  Patient Meets All Above Stages   []  Not Ready for Return to Sports   Comments:                               PLAN: Next visit: Ionto, progress per POC. [x] Continue per plan of care [] Alter current plan (see comments above)  [] Plan of care initiated [] Hold pending MD visit [] Discharge      Electronically signed by:  Matt Alford PT, DPT    Note: If patient does not return for scheduled/ recommended follow up visits, this note will serve as a discharge from care along with most recent update on progress. Access Code: [email or web address]   URL: [email or web address]   Date: 07/24/2020   Prepared by: Jamel Dale     Exercises   Long Sitting Calf Stretch with Strap - 3 reps - 1 sets - 30 hold - 2x daily - 7x weekly   Long Sitting Soleus Stretch on Bolster with Strap - 3 reps - 1 sets - 30 hold - 2x daily - 7x weekly   Seated Plantar Fascia Stretch - 5 reps - 1 sets - 10 hold - 2x daily - 7x weekly   Seated Arch Lifts - 10 reps - 1 sets - 5 hold - 2x daily - 7x weekly   Long Sitting Ankle Eversion with Resistance - 10 reps - 2 sets - 1x daily - 7x weekly   Long Sitting Ankle Inversion with Resistance - 10 reps - 2 sets - 1x daily - 7x weekly

## 2020-09-01 ENCOUNTER — HOSPITAL ENCOUNTER (OUTPATIENT)
Dept: PHYSICAL THERAPY | Age: 60
Setting detail: THERAPIES SERIES
Discharge: HOME | End: 2020-09-01
Payer: COMMERCIAL

## 2020-09-01 PROCEDURE — 97033 APP MDLTY 1+IONTPHRSIS EA 15: CPT

## 2020-09-01 PROCEDURE — 97110 THERAPEUTIC EXERCISES: CPT

## 2020-09-01 PROCEDURE — 97112 NEUROMUSCULAR REEDUCATION: CPT

## 2020-09-01 PROCEDURE — 97140 MANUAL THERAPY 1/> REGIONS: CPT

## 2020-09-04 ENCOUNTER — HOSPITAL ENCOUNTER (OUTPATIENT)
Dept: PHYSICAL THERAPY | Age: 60
Setting detail: THERAPIES SERIES
Discharge: HOME | End: 2020-09-04
Payer: COMMERCIAL

## 2020-09-04 PROCEDURE — 97033 APP MDLTY 1+IONTPHRSIS EA 15: CPT

## 2020-09-04 PROCEDURE — 97140 MANUAL THERAPY 1/> REGIONS: CPT

## 2020-09-04 PROCEDURE — 97110 THERAPEUTIC EXERCISES: CPT

## 2020-09-04 PROCEDURE — 97112 NEUROMUSCULAR REEDUCATION: CPT

## 2020-09-04 NOTE — FLOWSHEET NOTE
[facility],  [address] [place]  Phone: [phone]  Fax [phone]    Physical Therapy Treatment Note/ Progress Report:           Date:  2020    Patient Name:  Lorenzo Chávez    :  1960  MRN: [id number]  Restrictions/Precautions:    Medical/Treatment Diagnosis Information:  Diagnosis: L foot pain, plantarfasciitis M79.672, M72.2  Treatment Diagnosis: M79.672 L foot pain  Insurance/Certification information:  PT Insurance Information: [facility]  Physician Information:  Referring Practitioner: Dr. Annabelle Luna  Has the plan of care been signed (Y/N):        []  Yes  [x]  No     Date of Patient follow up with Physician: 2020      Is this a Progress Report:     [x]  Yes  []  No         If Yes:  Date Range for reporting period:  Beginning 2020  Ending 20  Progress report will be due (10 Rx or 30 days whichever is less): POC       Recertification will be due (POC Duration  / 90 days whichever is less):  2020      Visit # Insurance Allowable Requires auth   12 30    []no        []yes:     Functional Scale: LEFS 51%   Date assessed:  2020   LEFS 40% disability     20     Therapy Diagnosis/Practice Pattern: 4E       Number of Comorbidities:  []0     [x]1-2    []3+    Latex Allergy:  [x]NO      []YES  Preferred Language for Healthcare:   [x]English       []other:      Pain level:  1-3/10     SUBJECTIVE:  Pt has no pain in heel entering PT, but calls it \"soreness\" now.     OBJECTIVE:    Observation: still TTP med calc tubercle and proximal PF   Test measurements:   ROM LEFT RIGHT   HIP Flex       HIP Abd       HIP Ext       HIP IR       HIP ER       Knee ext       Knee Flex       Ankle PF 45 pain  60   Ankle DF +5 +10   Ankle In 35 40   Ankle Ev 25  25   Strength  LEFT RIGHT   HIP Flexors 4+ 4+   HIP Abductors       HIP Ext       Hip ER       Knee EXT (quad) 5 4+   Knee Flex (HS) 5 5 Ankle DF 5 5   Ankle PF 5 5   Ankle Inv 4+ 5   Ankle EV 4+ 5                   RESTRICTIONS/PRECAUTIONS: hx of r prox tib-fib fx; hx of parathyroid cancer     Exercises/Interventions:     Therapeutic Ex (81482) Sets/sec Reps Notes/CUES   Pt ed: discussed foot structure, has adequate flexibility but needs arch support for more stability in her shoes (she got new gym shoe with neutral footbed but could benefit from OTC orthotic in them.) Rec powerstep vs superfeet, but also can wait to see MD if wants to go through DME. Also asked pt to purchase foot rubz ball for self MFR. Discussed options for ionto if pain persists-can ask MD at visit next week.       Standing gastroc and soleus (incline board now that she has orthotics) 3x30\" ea Hep    Sitting PF stretch   Hep    Sitting doming exercise   Toe yoga-in standing today  qucik review for Hep         Rolling to L foot in sitting  3 min  Green theratube, Hep    Ankle inv, ev (heel anchored)  GVL, HEP   Sitting PF eccentrics on 1/2 FR  No weight on knee yet-sore in heel   Soleus press  30#   AP rock on 1/2 FR (roll down)   standing   MW retro MW with HR's x20 ea  Cues for even pressure on foot; GVL at ankles         Pt edu: weaning into orthotic   Verbal review   Manual Intervention ([id number])      STM/IASTM L calf and PF-  15'     TCJ mob post GIII     Toe ext stretch, PF stretch                       NMR re-education (30265)   CUES NEEDED   BAPS AP, ML, circles clockwise, ccw  lvl 2 standing, cues for FHB, sore afterward    Squat with B LEs on individual 1/2 rolls  Squat on DD   3x10    SLS with 3D LE kick 3x10 trips R, L ea OVL at ankles   SLS on Airex  \" \" \"  With 3 D LE reach 2x30\"  x10 trips R,  L stance     Tandem balance on 1/2 FR     Tandem balance with UE chops  Cue for 1st ray   Walk in place on DD      4 way Resisted gait on CC with 3D LE tap last step      Balance board lat taps 2x50   UE supp   SLS with 3D LE position with rebounder toss x10 ea, R, L ea  2# MB   Therapeutic Activity (81840)                              Modalities      Ionto STAT patch            Therapeutic Exercise and NMR EXR  [x] (58103) Provided verbal/tactile cueing for activities related to strengthening, flexibility, endurance, ROM for improvements in LE, proximal hip, and core control with self care, mobility, lifting, ambulation. [x] (70382) Provided verbal/tactile cueing for activities related to improving balance, coordination, kinesthetic sense, posture, motor skill, proprioception  to assist with LE, proximal hip, and core control in self care, mobility, lifting, ambulation and eccentric single leg control.      NMR and Therapeutic Activities:    [x] (45561 or 22747) Provided verbal/tactile cueing for activities related to improving balance, coordination, kinesthetic sense, posture, motor skill, proprioception and motor activation to allow for proper function of core, proximal hip and LE with self care and ADLs  [] (77172) Gait Re-education- Provided training and instruction to the patient for proper LE, core and proximal hip recruitment and positioning and eccentric body weight control with ambulation re-education including up and down stairs     Home Exercise Program:    [] (47925) Reviewed/Progressed HEP activities related to strengthening, flexibility, endurance, ROM of core, proximal hip and LE for functional self-care, mobility, lifting and ambulation/stair navigation   [] (92638)Reviewed/Progressed HEP activities related to improving balance, coordination, kinesthetic sense, posture, motor skill, proprioception of core, proximal hip and LE for self care, mobility, lifting, and ambulation/stair navigation      Manual Treatments:  PROM / STM / Oscillations-Mobs:  G-I, II, III, IV (PA's, Inf., Post.)  [x] (21857) Provided manual therapy to mobilize LE, proximal hip and/or LS spine soft tissue/joints for the purpose of modulating pain, promoting relaxation,  increasing ROM, reducing/eliminating soft tissue swelling/inflammation/restriction, improving soft tissue extensibility and allowing for proper ROM for normal function with self care, mobility, lifting and ambulation. Modalities:  ionto 4.0mA 2mL=10' + 5' set-up/education i [] GAME READY (VASO)- for significant edema, swelling, pain control. (med comp)  Charges:  Timed Code Treatment Minutes: 50   Total Treatment Minutes: 55 (ionto set-up)       [facility] time in/time out:   (and requires time in and out for each CPT code)    [] EVAL (LOW) 77689 (typically 20 minutes face-to-face)  [] EVAL (MOD) 17950 (typically 30 minutes face-to-face)  [] EVAL (HIGH) 12807 (typically 45 minutes face-to-face)  [] RE-EVAL     [x] TR(92582) x 1  [x] IONTO  [x] NMR (51119) x 1    [] VASO  [x] Manual (15344) x 1   [] Other:  [] TA x      [] Mech Traction (12002)  [] ES(attended) (48881)      [] ES (un) (25162):       GOALS:   Therapist goals for Patient:   Short Term Goals: To be achieved in: 2 weeks  1. Independent in HEP and progression per patient tolerance, in order to prevent re-injury. [x] Progressing: [] Met: [] Not Met: [] Adjusted  2. Patient will have a decrease in pain to facilitate improvement in movement, function, and ADLs as indicated by Functional Deficits. [x] Progressing: [] Met: [] Not Met: [] Adjusted    Long Term Goals: To be achieved in: 6 weeks  1. Disability index score of 25% or less for the LEFS to assist with reaching prior level of function. [x] Progressing: [] Met: [] Not Met: [] Adjusted  2. Patient will demonstrate increased AROM to L ankle DF to +10 to allow for proper joint functioning as indicated by patients Functional Deficits. [x] Progressing: [] Met: [] Not Met: [] Adjusted  3. Patient will demonstrate an increase in Strength to good proximal hip strength and control, within 5lb HHD in LE to allow for proper functional mobility as indicated by patients Functional Deficits.  [x] Progressing: [] Met: [] Not Met: [] Adjusted  4. Patient will return to being able to ambulate, ascend and descend stairs   without increased symptoms or restriction. [x] Progressing: [] Met: [] Not Met: [] Adjusted  5. Pt able to return to work as      [] Progressing: [x] Met: [] Not Met: [] Adjusted    Progression Towards Functional goals:  [x] Patient is progressing as expected towards functional goals listed. [] Progression is slowed due to complexities listed. [] Progression has been slowed due to co-morbidities. [] Plan just implemented, too soon to assess goals progression  [] Other:         Overall Progression Towards Functional goals/ Treatment Progress Update:  [x] Patient is progressing as expected towards functional goals listed. [] Progression is slowed due to complexities/Impairments listed. [] Progression has been slowed due to co-morbidities. [] Plan just implemented, too soon to assess goals progression <30days   [] Goals require adjustment due to lack of progress  [] Patient is not progressing as expected and requires additional follow up with physician  [] Other    Prognosis for POC: [x] Good [] Fair  [] Poor      Patient requires continued skilled intervention: [x] Yes  [] No    Treatment/Activity Tolerance:  [x] Patient able to complete treatment  [] Patient limited by fatigue  [] Patient limited by pain    [] Patient limited by other medical complications  [] Other:     ASSESSMENT: Pt only has pulling in her heel, not pain, with standing balance work now. She is making progress towards goals. Will continue ionto as she's responded well thus far.      Return to Play: (if applicable)   []  Stage 1: Intro to Strength   []  Stage 2: Return to Run and Strength   []  Stage 3: Return to Jump and Strength   []  Stage 4: Dynamic Strength and Agility   []  Stage 5: Sport Specific Training     []  Ready to Return to Play, Meets All Above Stages   []  Not Ready for Return to Sports   Comments: PLAN: Next visit: Johann Macdonald, progress per POC. [x] Continue per plan of care [] Alter current plan (see comments above)  [] Plan of care initiated [] Hold pending MD visit [] Discharge      Electronically signed by:  Campbell Cabot, PT, DPT    Note: If patient does not return for scheduled/ recommended follow up visits, this note will serve as a discharge from care along with most recent update on progress. Access Code: [email or web address]   URL: [email or web address]   Date: 07/24/2020   Prepared by: Ruth Ann Valadez     Exercises   Long Sitting Calf Stretch with Strap - 3 reps - 1 sets - 30 hold - 2x daily - 7x weekly   Long Sitting Soleus Stretch on Bolster with Strap - 3 reps - 1 sets - 30 hold - 2x daily - 7x weekly   Seated Plantar Fascia Stretch - 5 reps - 1 sets - 10 hold - 2x daily - 7x weekly   Seated Arch Lifts - 10 reps - 1 sets - 5 hold - 2x daily - 7x weekly   Long Sitting Ankle Eversion with Resistance - 10 reps - 2 sets - 1x daily - 7x weekly   Long Sitting Ankle Inversion with Resistance - 10 reps - 2 sets - 1x daily - 7x weekly

## 2020-09-09 ENCOUNTER — HOSPITAL ENCOUNTER (OUTPATIENT)
Dept: PHYSICAL THERAPY | Age: 60
Setting detail: THERAPIES SERIES
Discharge: HOME | End: 2020-09-09
Payer: COMMERCIAL

## 2020-09-09 PROCEDURE — 97112 NEUROMUSCULAR REEDUCATION: CPT | Performed by: PHYSICAL THERAPIST

## 2020-09-09 PROCEDURE — 97110 THERAPEUTIC EXERCISES: CPT | Performed by: PHYSICAL THERAPIST

## 2020-09-09 PROCEDURE — 97033 APP MDLTY 1+IONTPHRSIS EA 15: CPT | Performed by: PHYSICAL THERAPIST

## 2020-09-09 PROCEDURE — 97140 MANUAL THERAPY 1/> REGIONS: CPT | Performed by: PHYSICAL THERAPIST

## 2020-09-09 NOTE — FLOWSHEET NOTE
[facility],  [address] [facility] [place]  Phone: [phone]  Fax [phone]    Physical Therapy Treatment Note/ Progress Report:           Date:  2020    Patient Name:  Glenn Will    :  1960  MRN: [id number]  Restrictions/Precautions:    Medical/Treatment Diagnosis Information:  Diagnosis: L foot pain, plantarfasciitis M79.672, M72.2  Treatment Diagnosis: M79.672 L foot pain  Insurance/Certification information:  PT Insurance Information: [facility]  Physician Information:  Referring Practitioner: Dr. Radha Parham  Has the plan of care been signed (Y/N):        []  Yes  [x]  No     Date of Patient follow up with Physician: 2020      Is this a Progress Report:     [x]  Yes  []  No         If Yes:  Date Range for reporting period:  Beginning 2020  Ending 20  Progress report will be due (10 Rx or 30 days whichever is less): POC       Recertification will be due (POC Duration  / 90 days whichever is less): 10/9/20      Visit # Insurance Allowable Requires auth   13 30    []no        []yes:     Functional Scale: LEFS 51%   Date assessed:  2020   LEFS 40% disability     20     Therapy Diagnosis/Practice Pattern: 4E       Number of Comorbidities:  []0     [x]1-2    []3+    Latex Allergy:  [x]NO      []YES  Preferred Language for Healthcare:   [x]English       []other:      Pain level:  1-3/10 8/10 at end of shift yesterday    SUBJECTIVE:  Pt she worked a 12+ hour, multi-leg shift yesterday and was quite painful by end of day. Better recovery this morning than previously. OBJECTIVE:    Observation: still TTP med calc tubercle and proximal PF, functional hip weakness/[place] with balance activities.    Test measurements:   ROM LEFT RIGHT   HIP Flex       HIP Abd       HIP Ext       HIP IR       HIP ER       Knee ext       Knee Flex       Ankle PF 45 pain  60   Ankle DF +5 +10   Ankle In 35 40   Ankle Ev 25  25   Strength  LEFT RIGHT   HIP Flexors 4+ 4+   HIP Abductors       HIP Ext       Hip ER       Knee EXT (quad) 5 4+   Knee Flex (HS) 5 5   Ankle DF 5 5   Ankle PF 5 5   Ankle Inv 4+ 5   Ankle EV 4+ 5                   RESTRICTIONS/PRECAUTIONS: hx of r prox tib-fib fx; hx of parathyroid cancer     Exercises/Interventions:     Therapeutic Ex (10405) Sets/sec/reps Notes/CUES   Pt ed: discussed foot structure, has adequate flexibility but needs arch support for more stability in her shoes (she got new gym shoe with neutral footbed but could benefit from OTC orthotic in them.) Rec powerstep vs superfeet, but also can wait to see MD if wants to go through DME. Also asked pt to purchase foot rubz ball for self MFR. Discussed options for ionto if pain persists-can ask MD at visit next week.      Standing gastroc and soleus (incline board now that she has orthotics) 3x30\" eaHep    Sitting PF stretch  Hep    Sitting doming exercise   Toe yoga-in standing today qucik review for Hep        Rolling to L foot in sitting  3 min ,tennis ball, Hep    Ankle inv, ev (heel anchored) GVL, HEP   Sitting PF eccentrics on 1/2 FR No weight on knee yet-sore in heel   Soleus press 30#   AP rock on 1/2 FR (roll down)  standing   MW retro MW with HR's x20 ea Cues for even pressure on foot; GVL at ankles        Pt edu: weaning into orthotic  Verbal review   Manual Intervention ([id number])     STM/IASTM L calf and PF-  15'    TCJ mob post GIII    Toe ext stretch, PF stretch                   NMR re-education (26161)  CUES NEEDED   BAPS AP, ML, circles clockwise, ccw lvl 2 standing, cues for FHB, sore afterward    Squat with B LEs on individual 1/2 rolls  Squat on DD   3x10   SLS with 3D LE kick on air-ex x15 trips R, L eaGVL at ankles   SLS on Airex  \" \" \"  With 3 D LE reach 2x30\"  x10 trips R,  L stance    Tandem balance on 1/2 FR    Tandem balance with UE chops Cue for 1st ray   Walk in place on DD     4 way Resisted gait on CC with 3D LE tap last step     Balance board lat taps UE supp   SLS with 3D LE position with rebounder toss 2# MB   LSD w/ heel off 4\" x20 Hands on wall, cues for foot tap   Therapeutic Activity (86896)                         Modalities     Ion clinic phoreser            Therapeutic Exercise and NMR EXR  [x] (24202) Provided verbal/tactile cueing for activities related to strengthening, flexibility, endurance, ROM for improvements in LE, proximal hip, and core control with self care, mobility, lifting, ambulation. [x] (62009) Provided verbal/tactile cueing for activities related to improving balance, coordination, kinesthetic sense, posture, motor skill, proprioception  to assist with LE, proximal hip, and core control in self care, mobility, lifting, ambulation and eccentric single leg control.      NMR and Therapeutic Activities:    [x] (03206 or 85088) Provided verbal/tactile cueing for activities related to improving balance, coordination, kinesthetic sense, posture, motor skill, proprioception and motor activation to allow for proper function of core, proximal hip and LE with self care and ADLs  [] (10721) Gait Re-education- Provided training and instruction to the patient for proper LE, core and proximal hip recruitment and positioning and eccentric body weight control with ambulation re-education including up and down stairs     Home Exercise Program:    [] (15099) Reviewed/Progressed HEP activities related to strengthening, flexibility, endurance, ROM of core, proximal hip and LE for functional self-care, mobility, lifting and ambulation/stair navigation   [] (64750)Reviewed/Progressed HEP activities related to improving balance, coordination, kinesthetic sense, posture, motor skill, proprioception of core, proximal hip and LE for self care, mobility, lifting, and ambulation/stair navigation      Manual Treatments:  PROM / STM / Oscillations-Mobs:  G-I, II, III, IV (PA's, Inf., Post.)  [x] (86486) Provided manual therapy to mobilize LE, proximal hip and/or LS spine soft tissue/joints for the purpose of modulating pain, promoting relaxation,  increasing ROM, reducing/eliminating soft tissue swelling/inflammation/restriction, improving soft tissue extensibility and allowing for proper ROM for normal function with self care, mobility, lifting and ambulation. Modalities:  ionto 4.0mA 2mL=10' + 5' set-up/education i [] GAME READY (VASO)- for significant edema, swelling, pain control. (med comp)  Charges:  Timed Code Treatment Minutes: 50   Total Treatment Minutes: 55 (ionto set-up)       [facility] time in/time out:   (and requires time in and out for each CPT code)    [] EVAL (LOW) 82858 (typically 20 minutes face-to-face)  [] EVAL (MOD) 99652 (typically 30 minutes face-to-face)  [] EVAL (HIGH) 95929 (typically 45 minutes face-to-face)  [] RE-EVAL     [x] NZ(48937) x 1  [x] IONTO  [x] NMR (73607) x 1    [] VASO  [x] Manual (00131) x 1   [] Other:  [] TA x      [] Mech Traction (60226)  [] ES(attended) (29981)      [] ES (un) (92946):       GOALS:   Therapist goals for Patient:   Short Term Goals: To be achieved in: 2 weeks  1. Independent in HEP and progression per patient tolerance, in order to prevent re-injury. [x] Progressing: [] Met: [] Not Met: [] Adjusted  2. Patient will have a decrease in pain to facilitate improvement in movement, function, and ADLs as indicated by Functional Deficits. [x] Progressing: [] Met: [] Not Met: [] Adjusted    Long Term Goals: To be achieved by: 10/9/20  1. Disability index score of 25% or less for the LEFS to assist with reaching prior level of function. [x] Progressing: [] Met: [] Not Met: [] Adjusted  2. Patient will demonstrate increased AROM to L ankle DF to +10 to allow for proper joint functioning as indicated by patients Functional Deficits. [x] Progressing: [] Met: [] Not Met: [] Adjusted  3.  Patient will demonstrate an increase in Strength to good proximal hip strength and control, within 5lb HHD in LE to allow for proper functional mobility as indicated by patients Functional Deficits. [x] Progressing: [] Met: [] Not Met: [] Adjusted  4. Patient will return to being able to ambulate, ascend and descend stairs   without increased symptoms or restriction. [x] Progressing: [] Met: [] Not Met: [] Adjusted  5. Pt able to return to work as      [] Progressing: [x] Met: [] Not Met: [] Adjusted    Progression Towards Functional goals:  [x] Patient is progressing as expected towards functional goals listed. [] Progression is slowed due to complexities listed. [] Progression has been slowed due to co-morbidities. [] Plan just implemented, too soon to assess goals progression  [] Other:     Overall Progression Towards Functional goals/ Treatment Progress Update:  [x] Patient is progressing as expected towards functional goals listed. [] Progression is slowed due to complexities/Impairments listed. [] Progression has been slowed due to co-morbidities. [] Plan just implemented, too soon to assess goals progression <30days   [] Goals require adjustment due to lack of progress  [] Patient is not progressing as expected and requires additional follow up with physician  [] Other    Prognosis for POC: [x] Good [] Fair  [] Poor      Patient requires continued skilled intervention: [x] Yes  [] No    Treatment/Activity Tolerance:  [x] Patient able to complete treatment  [] Patient limited by fatigue  [] Patient limited by pain    [] Patient limited by other medical complications  [] Other:     ASSESSMENT: Pt showing progress with daily pain levels, ROM and strength, but ongoing calf and foot tightness and pain end of day with work and functional hip weakness that would benefit from continued skilled PT to address.       Return to Play: (if applicable)   []  Stage 1: Intro to Strength   []  Stage 2: Return to Run and Strength   []  Stage 3: Return to Jump and Strength   []  Stage 4: Dynamic Strength and Agility   []  Stage 5: Sport Specific Training     []  Ready to Return to Play, Meets All Above Stages   []  Not Ready for Return to Sports   Comments:                               PLAN: Cont POC, try wean to 1x/week  [x] Continue per plan of care [] Alter current plan (see comments above)  [] Plan of care initiated [] Hold pending MD visit [] Discharge      Electronically signed by:  Maria Victoria Velazquez PT,     Note: If patient does not return for scheduled/ recommended follow up visits, this note will serve as a discharge from care along with most recent update on progress. Access Code: [email or web address]   URL: [email or web address]   Date: 07/24/2020   Prepared by: Tee Mendoza     Exercises   Long Sitting Calf Stretch with Strap - 3 reps - 1 sets - 30 hold - 2x daily - 7x weekly   Long Sitting Soleus Stretch on Bolster with Strap - 3 reps - 1 sets - 30 hold - 2x daily - 7x weekly   Seated Plantar Fascia Stretch - 5 reps - 1 sets - 10 hold - 2x daily - 7x weekly   Seated Arch Lifts - 10 reps - 1 sets - 5 hold - 2x daily - 7x weekly   Long Sitting Ankle Eversion with Resistance - 10 reps - 2 sets - 1x daily - 7x weekly   Long Sitting Ankle Inversion with Resistance - 10 reps - 2 sets - 1x daily - 7x weekly

## 2020-09-15 ENCOUNTER — HOSPITAL ENCOUNTER (OUTPATIENT)
Dept: PHYSICAL THERAPY | Age: 60
Setting detail: THERAPIES SERIES
Discharge: HOME | End: 2020-09-15
Payer: COMMERCIAL

## 2020-09-15 PROCEDURE — 97140 MANUAL THERAPY 1/> REGIONS: CPT

## 2020-09-15 PROCEDURE — 97033 APP MDLTY 1+IONTPHRSIS EA 15: CPT

## 2020-09-15 PROCEDURE — 97110 THERAPEUTIC EXERCISES: CPT

## 2020-09-15 PROCEDURE — 97112 NEUROMUSCULAR REEDUCATION: CPT

## 2020-09-15 NOTE — FLOWSHEET NOTE
[facility],  [address]  Phone: [phone]  Fax [phone]    Physical Therapy Treatment Note/ Progress Report:           Date:  9/15/2020    Patient Name:  Tang Anderson    :  1960  MRN: [id number]  Restrictions/Precautions:    Medical/Treatment Diagnosis Information:  Diagnosis: L foot pain, plantarfasciitis M79.672, M72.2  Treatment Diagnosis: M79.672 L foot pain  Insurance/Certification information:  PT Insurance Information: [facility], [facility]  Physician Information:  Referring Practitioner: Dr. Edwige Wallace  Has the plan of care been signed (Y/N):        []  Yes  [x]  No     Date of Patient follow up with Physician: 2020      Is this a Progress Report:     [x]  Yes  []  No         If Yes:  Date Range for reporting period:  Beginning 2020  Ending 20  Progress report will be due (10 Rx or 30 days whichever is less): POC       Recertification will be due (POC Duration  / 90 days whichever is less): 10/9/20      Visit # Insurance Allowable Requires auth   14 30    []no        []yes:     Functional Scale: LEFS 51%   Date assessed:  2020   LEFS 40% disability     20     Therapy Diagnosis/Practice Pattern: 4E       Number of Comorbidities:  []0     [x]1-2    []3+    Latex Allergy:  [x]NO      []YES  Preferred Language for Healthcare:   [x]English       []other:      Pain level:  1-3/10     SUBJECTIVE:  Pt states her feet are better today after a few days not having to stand/walk all day. She still has heel pain but it's manageable most of the time now with her orthotics. OBJECTIVE:    Observation: still TTP med calc tubercle and proximal PF, functional hip weakness/[place] with balance activities.    Test measurements:   ROM LEFT RIGHT   HIP Flex       HIP Abd       HIP Ext       HIP IR       HIP ER       Knee ext       Knee Flex       Ankle PF 45 pain  60   Ankle DF +5 +10 Ankle In 35 40   Ankle Ev 25  25   Strength  LEFT RIGHT   HIP Flexors 4+ 4+   HIP Abductors       HIP Ext       Hip ER       Knee EXT (quad) 5 4+   Knee Flex (HS) 5 5   Ankle DF 5 5   Ankle PF 5 5   Ankle Inv 4+ 5   Ankle EV 4+ 5                   RESTRICTIONS/PRECAUTIONS: hx of r prox tib-fib fx; hx of parathyroid cancer     Exercises/Interventions:     Therapeutic Ex (10178) Sets/sec/reps Notes/CUES   Pt ed: discussed foot structure, has adequate flexibility but needs arch support for more stability in her shoes (she got new gym shoe with neutral footbed but could benefit from OTC orthotic in them.) Rec powerstep vs superfeet, but also can wait to see MD if wants to go through DME. Also asked pt to purchase foot rubz ball for self MFR. Discussed options for ionto if pain persists-can ask MD at visit next week.      Standing gastroc and soleus (incline board now that she has orthotics) 3x30\" eaHep    Sitting PF stretch  Hep    Sitting doming exercise   Toe yoga-in standing today qucik review for Hep        Rolling to L foot in sitting  3 min Green theratube, Hep    Ankle inv, ev (heel anchored) GVL, HEP   Sitting PF eccentrics on 1/2 FR No weight on knee yet-sore in heel   Soleus press 30#   AP rock on 1/2 FR (roll down)  2'standing   MW retro MW with HR's x20 ea Cues for even pressure on foot; GVL at ankles   SS with PF 20'x2 GVL    Pt edu: weaning into orthotic  Verbal review   Manual Intervention ([id number])     STM/IASTM L calf and PF-  15'    TCJ mob post GIII    Toe ext stretch, PF stretch                   NMR re-education (17161)  CUES NEEDED   BAPS AP, ML, circles clockwise, ccw lvl 2 standing, cues for FHB, sore afterward    Squat with B LEs on individual 1/2 rolls  Squat on DD   3x10   SLS with 3D LE kick on air-ex x10 trips R, L eaGVL at ankles   SLS on Airex   2x30\"    Tandem balance on 1/2 FR 1' R, L leading   Tandem balance with UE chops Cue for 1st ray   Walk in place on DD     4 way Resisted gait on CC with 3D LE tap last step     Balance board lat taps UE supp   SLS with 3D LE position with rebounder toss 2# MB   SLS mini squat glider quick flex/ext To fatigue x2 R, L ea Cue for stance leg control   LSD w/ heel off 4\" 2x15 Hands on wall, cues for foot tap   Therapeutic Activity (72286)                         Modalities     [facility] clinic phoreser            Therapeutic Exercise and NMR EXR  [x] (97945) Provided verbal/tactile cueing for activities related to strengthening, flexibility, endurance, ROM for improvements in LE, proximal hip, and core control with self care, mobility, lifting, ambulation. [x] (36899) Provided verbal/tactile cueing for activities related to improving balance, coordination, kinesthetic sense, posture, motor skill, proprioception  to assist with LE, proximal hip, and core control in self care, mobility, lifting, ambulation and eccentric single leg control.      NMR and Therapeutic Activities:    [x] (48304 or 42957) Provided verbal/tactile cueing for activities related to improving balance, coordination, kinesthetic sense, posture, motor skill, proprioception and motor activation to allow for proper function of core, proximal hip and LE with self care and ADLs  [] (88457) Gait Re-education- Provided training and instruction to the patient for proper LE, core and proximal hip recruitment and positioning and eccentric body weight control with ambulation re-education including up and down stairs     Home Exercise Program:    [] (34072) Reviewed/Progressed HEP activities related to strengthening, flexibility, endurance, ROM of core, proximal hip and LE for functional self-care, mobility, lifting and ambulation/stair navigation   [] (13399)Reviewed/Progressed HEP activities related to improving balance, coordination, kinesthetic sense, posture, motor skill, proprioception of core, proximal hip and LE for self care, mobility, lifting, and ambulation/stair navigation      Manual Treatments: PROM / STM / Oscillations-Mobs:  G-I, II, III, IV (PA's, Inf., Post.)  [x] (08147) Provided manual therapy to mobilize LE, proximal hip and/or LS spine soft tissue/joints for the purpose of modulating pain, promoting relaxation,  increasing ROM, reducing/eliminating soft tissue swelling/inflammation/restriction, improving soft tissue extensibility and allowing for proper ROM for normal function with self care, mobility, lifting and ambulation. Modalities:  ionto 4.0mA 2mL=10' + 5' set-up/education i [] GAME READY (VASO)- for significant edema, swelling, pain control. (med comp)  Charges:  Timed Code Treatment Minutes: 55   Total Treatment Minutes: 60 (ionto set-up)       [facility] time in/time out:   (and requires time in and out for each CPT code)    [] EVAL (LOW) 70954 (typically 20 minutes face-to-face)  [] EVAL (MOD) 72594 (typically 30 minutes face-to-face)  [] EVAL (HIGH) 84311 (typically 45 minutes face-to-face)  [] RE-EVAL     [x] QO(06882) x 1  [x] IONTO  [x] NMR (21342) x 1    [] VASO  [x] Manual (38160) x 1   [] Other:  [] TA x      [] Mech Traction (34760)  [] ES(attended) (15288)      [] ES (un) (21536):       GOALS:   Therapist goals for Patient:   Short Term Goals: To be achieved in: 2 weeks  1. Independent in HEP and progression per patient tolerance, in order to prevent re-injury. [x] Progressing: [] Met: [] Not Met: [] Adjusted  2. Patient will have a decrease in pain to facilitate improvement in movement, function, and ADLs as indicated by Functional Deficits. [x] Progressing: [] Met: [] Not Met: [] Adjusted    Long Term Goals: To be achieved by: 10/9/20  1. Disability index score of 25% or less for the LEFS to assist with reaching prior level of function. [x] Progressing: [] Met: [] Not Met: [] Adjusted  2. Patient will demonstrate increased AROM to L ankle DF to +10 to allow for proper joint functioning as indicated by patients Functional Deficits.    [x] Progressing: [] Met: [] Not Met: [] more month. Return to Play: (if applicable)   []  Stage 1: Intro to Strength   []  Stage 2: Return to Run and Strength   []  Stage 3: Return to Jump and Strength   []  Stage 4: Dynamic Strength and Agility   []  Stage 5: Sport Specific Training     []  Ready to Return to Play, Meets All Above Stages   []  Not Ready for Return to Sports   Comments:                               PLAN: Cont POC, try wean to 1x/week  [x] Continue per plan of care [] Alter current plan (see comments above)  [] Plan of care initiated [] Hold pending MD visit [] Discharge      Electronically signed by:  Neelam Chaves PT, DPT    Note: If patient does not return for scheduled/ recommended follow up visits, this note will serve as a discharge from care along with most recent update on progress. Access Code: [email or web address]   URL: [email or web address]   Date: 07/24/2020   Prepared by: Jorge Rosenthal     Exercises   Long Sitting Calf Stretch with Strap - 3 reps - 1 sets - 30 hold - 2x daily - 7x weekly   Long Sitting Soleus Stretch on Bolster with Strap - 3 reps - 1 sets - 30 hold - 2x daily - 7x weekly   Seated Plantar Fascia Stretch - 5 reps - 1 sets - 10 hold - 2x daily - 7x weekly   Seated Arch Lifts - 10 reps - 1 sets - 5 hold - 2x daily - 7x weekly   Long Sitting Ankle Eversion with Resistance - 10 reps - 2 sets - 1x daily - 7x weekly   Long Sitting Ankle Inversion with Resistance - 10 reps - 2 sets - 1x daily - 7x weekly

## 2020-09-25 ENCOUNTER — HOSPITAL ENCOUNTER (OUTPATIENT)
Dept: PHYSICAL THERAPY | Age: 60
Setting detail: THERAPIES SERIES
Discharge: HOME | End: 2020-09-25
Payer: COMMERCIAL

## 2020-09-25 PROCEDURE — 97110 THERAPEUTIC EXERCISES: CPT

## 2020-09-25 PROCEDURE — 97033 APP MDLTY 1+IONTPHRSIS EA 15: CPT

## 2020-09-25 PROCEDURE — 97140 MANUAL THERAPY 1/> REGIONS: CPT

## 2020-09-25 NOTE — FLOWSHEET NOTE
[facility],  [address]  Phone: [phone]  Fax [phone]    Physical Therapy Treatment Note/ Progress Report:           Date:  2020    Patient Name:  Luisa Gonzalez    :  1960  MRN: [id number]  Restrictions/Precautions:    Medical/Treatment Diagnosis Information:  Diagnosis: L foot pain, plantarfasciitis M79.672, M72.2  Treatment Diagnosis: M79.672 L foot pain  Insurance/Certification information:  PT Insurance Information: [facility]  Physician Information:  Referring Practitioner: Dr. Moe Danielle  Has the plan of care been signed (Y/N):        []  Yes  [x]  No     Date of Patient follow up with Physician: 2020      Is this a Progress Report:     [x]  Yes  []  No         If Yes:  Date Range for reporting period:  Beginning 2020  Ending 20  Progress report will be due (10 Rx or 30 days whichever is less): POC       Recertification will be due (POC Duration  / 90 days whichever is less): 10/9/20      Visit # Insurance Allowable Requires auth   15 30    []no        []yes:     Functional Scale: LEFS 51%   Date assessed:  2020   LEFS 40% disability     20     Therapy Diagnosis/Practice Pattern: 4E       Number of Comorbidities:  []0     [x]1-2    []3+    Latex Allergy:  [x]NO      []YES  Preferred Language for Healthcare:   [x]English       []other:      Pain level:  1-3/10     SUBJECTIVE:  Pt states her feet are better today after a few days not having to stand/walk all day. She still has heel pain but it's manageable most of the time now with her orthotics. OBJECTIVE:    Observation: still TTP med calc tubercle and proximal PF, functional hip weakness/[place] with balance activities.    Test measurements:   ROM LEFT RIGHT   HIP Flex       HIP Abd       HIP Ext       HIP IR       HIP ER       Knee ext       Knee Flex       Ankle PF 45 pain  60   Ankle DF +5 +10 Ankle In 35 40   Ankle Ev 25  25   Strength  LEFT RIGHT   HIP Flexors 4+ 4+   HIP Abductors       HIP Ext       Hip ER       Knee EXT (quad) 5 4+   Knee Flex (HS) 5 5   Ankle DF 5 5   Ankle PF 5 5   Ankle Inv 4+ 5   Ankle EV 4+ 5                   RESTRICTIONS/PRECAUTIONS: hx of r prox tib-fib fx; hx of parathyroid cancer     Exercises/Interventions:     Therapeutic Ex (94827) Sets/sec/reps Notes/CUES   Pt ed: discussed foot structure, has adequate flexibility but needs arch support for more stability in her shoes (she got new gym shoe with neutral footbed but could benefit from OTC orthotic in them.) Rec powerstep vs superfeet, but also can wait to see MD if wants to go through DME. Also asked pt to purchase foot rubz ball for self MFR. Discussed options for ionto if pain persists-can ask MD at visit next week.      Standing gastroc and soleus (incline board now that she has orthotics) 3x30\" eaHep    Sitting PF stretch  Hep    Sitting doming exercise   Toe yoga-in standing today qucik review for Hep    SL bridge, opp in 90-90 2x5 R,L   PF + toe flexion long sit 20xgreen       Rolling to L foot in sitting   Green theratube, Hep    Ankle inv, ev (heel anchored) GVL, HEP   Sitting PF eccentrics on 1/2 FR No weight on knee yet-sore in heel   Soleus press 30#   AP rock on 1/2 FR (roll down)  2'standing   SS with HR's 4x20'  Cues for even pressure on foot; GVL at ankles   SS with PF 20'x2 GVL    Pt edu: weaning into orthotic  Verbal review   Manual Intervention ([id number])     STM/IASTM L calf and PF-, posterior tib 15'    TCJ mob post GIII    Toe ext stretch, PF stretch 15\"x3 ea                  NMR re-education (29880)  CUES NEEDED   BAPS AP, ML, circles clockwise, ccw lvl 2 standing, cues for FHB, sore afterward    Squat with B LEs on individual 1/2 rolls  Squat on DD   3x10   SLS with 3D LE kick on air-ex GVL at ankles   SLS on Airex       Tandem balance on 1/2 FR 1' R, L leading   Tandem balance with UE chops Cue for 1st ray   Walk in place on DD     4 way Resisted gait on CC with 3D LE tap last step     Balance board lat taps UE supp   SLS with 3D LE position with rebounder toss 2# MB   SLS mini squat glider quick flex/ext To fatigue x2 R, L ea Cue for stance leg control   LSD w/ heel off  Posterior reach with heel off   4\" 2x10 Hands on wall, cues for foot tap   Therapeutic Activity (69580)                         Modalities     [facility] ph[facility]            Therapeutic Exercise and NMR EXR  [x] (81956) Provided verbal/tactile cueing for activities related to strengthening, flexibility, endurance, ROM for improvements in LE, proximal hip, and core control with self care, mobility, lifting, ambulation. [x] (80667) Provided verbal/tactile cueing for activities related to improving balance, coordination, kinesthetic sense, posture, motor skill, proprioception  to assist with LE, proximal hip, and core control in self care, mobility, lifting, ambulation and eccentric single leg control.      NMR and Therapeutic Activities:    [x] (60544 or 26534) Provided verbal/tactile cueing for activities related to improving balance, coordination, kinesthetic sense, posture, motor skill, proprioception and motor activation to allow for proper function of core, proximal hip and LE with self care and ADLs  [] (29426) Gait Re-education- Provided training and instruction to the patient for proper LE, core and proximal hip recruitment and positioning and eccentric body weight control with ambulation re-education including up and down stairs     Home Exercise Program:    [] (89150) Reviewed/Progressed HEP activities related to strengthening, flexibility, endurance, ROM of core, proximal hip and LE for functional self-care, mobility, lifting and ambulation/stair navigation   [] (71001)Reviewed/Progressed HEP activities related to improving balance, coordination, kinesthetic sense, posture, motor skill, proprioception of core, proximal hip and LE as indicated by patients Functional Deficits. [x] Progressing: [] Met: [] Not Met: [] Adjusted  3. Patient will demonstrate an increase in Strength to good proximal hip strength and control, within 5lb HHD in LE to allow for proper functional mobility as indicated by patients Functional Deficits. [x] Progressing: [] Met: [] Not Met: [] Adjusted  4. Patient will return to being able to ambulate, ascend and descend stairs   without increased symptoms or restriction. [x] Progressing: [] Met: [] Not Met: [] Adjusted  5. Pt able to return to work as      [] Progressing: [x] Met: [] Not Met: [] Adjusted    Progression Towards Functional goals:  [x] Patient is progressing as expected towards functional goals listed. [] Progression is slowed due to complexities listed. [] Progression has been slowed due to co-morbidities. [] Plan just implemented, too soon to assess goals progression  [] Other:     Overall Progression Towards Functional goals/ Treatment Progress Update:  [x] Patient is progressing as expected towards functional goals listed. [] Progression is slowed due to complexities/Impairments listed. [] Progression has been slowed due to co-morbidities.   [] Plan just implemented, too soon to assess goals progression <30days   [] Goals require adjustment due to lack of progress  [] Patient is not progressing as expected and requires additional follow up with physician  [] Other    Prognosis for POC: [x] Good [] Fair  [] Poor      Patient requires continued skilled intervention: [x] Yes  [] No    Treatment/Activity Tolerance:  [x] Patient able to complete treatment  [] Patient limited by fatigue  [] Patient limited by pain    [] Patient limited by other medical complications  [] Other:     ASSESSMENT: Pt showing progress with daily pain levels, ROM and strength, but ongoing calf and foot tightness and pain end of day with work and functional hip weakness that would benefit from continued skilled PT to address. [place] has continued to help pt, therefore will continue use in POC 1 more month. Return to Play: (if applicable)   []  Stage 1: Intro to Strength   []  Stage 2: Return to Run and Strength   []  Stage 3: Return to Jump and Strength   []  Stage 4: Dynamic Strength and Agility   []  Stage 5: Sport Specific Training     []  Ready to Return to Play, Meets All Above Stages   []  Not Ready for Return to Sports   Comments:                               PLAN: Cont POC, try wean to 1x/week  [x] Continue per plan of care [] Alter current plan (see comments above)  [] Plan of care initiated [] Hold pending MD visit [] Discharge      Electronically signed by:  Marilynn Julio, PT, DPT    Note: If patient does not return for scheduled/ recommended follow up visits, this note will serve as a discharge from care along with most recent update on progress. Access Code: [email or web address]   URL: [email or web address]   Date: 07/24/2020   Prepared by: Marilynn Julio     Exercises   Long Sitting Calf Stretch with Strap - 3 reps - 1 sets - 30 hold - 2x daily - 7x weekly   Long Sitting Soleus Stretch on Bolster with Strap - 3 reps - 1 sets - 30 hold - 2x daily - 7x weekly   Seated Plantar Fascia Stretch - 5 reps - 1 sets - 10 hold - 2x daily - 7x weekly   Seated Arch Lifts - 10 reps - 1 sets - 5 hold - 2x daily - 7x weekly   Long Sitting Ankle Eversion with Resistance - 10 reps - 2 sets - 1x daily - 7x weekly   Long Sitting Ankle Inversion with Resistance - 10 reps - 2 sets - 1x daily - 7x weekly

## 2020-09-29 ENCOUNTER — HOSPITAL ENCOUNTER (OUTPATIENT)
Dept: PHYSICAL THERAPY | Age: 60
Setting detail: THERAPIES SERIES
Discharge: HOME | End: 2020-09-29
Payer: COMMERCIAL

## 2020-09-29 PROCEDURE — 97140 MANUAL THERAPY 1/> REGIONS: CPT

## 2020-09-29 PROCEDURE — 97112 NEUROMUSCULAR REEDUCATION: CPT

## 2020-09-29 PROCEDURE — 97110 THERAPEUTIC EXERCISES: CPT

## 2020-09-29 PROCEDURE — 97033 APP MDLTY 1+IONTPHRSIS EA 15: CPT

## 2020-09-29 NOTE — FLOWSHEET NOTE
[facility],  [address] [facility]  Phone: [phone]  Fax [phone]    Physical Therapy Treatment Note/ Progress Report:           Date:  2020    Patient Name:  Glenn Will    :  1960  MRN: [id number]  Restrictions/Precautions:    Medical/Treatment Diagnosis Information:  Diagnosis: L foot pain, plantarfasciitis M79.672, M72.2  Treatment Diagnosis: M79.672 L foot pain  Insurance/Certification information:  PT Insurance Information: [facility]  Physician Information:  Referring Practitioner: Dr. Radha Parham  Has the plan of care been signed (Y/N):        []  Yes  [x]  No     Date of Patient follow up with Physician: 2020      Is this a Progress Report:     [x]  Yes  []  No         If Yes:  Date Range for reporting period:  Beginning 2020  Ending 20  Progress report will be due (10 Rx or 30 days whichever is less): POC 27      Recertification will be due (POC Duration  / 90 days whichever is less): 10/9/20      Visit # Insurance Allowable Requires auth   16 30    []no        []yes:     Functional Scale: LEFS 51%   Date assessed:  2020   LEFS 40% disability     20     Therapy Diagnosis/Practice Pattern: 4E       Number of Comorbidities:  []0     [x]1-2    []3+    Latex Allergy:  [x]NO      []YES  Preferred Language for Healthcare:   [x]English       []other:      Pain level:  1-3/10     SUBJECTIVE:  Pt is achy in her heel still, but she feels her pain is still better each week. OBJECTIVE:    Observation: still TTP med calc tubercle and proximal PF, functional hip weakness/[place] with balance activities.    Test measurements:   ROM LEFT RIGHT   HIP Flex       HIP Abd       HIP Ext       HIP IR       HIP ER       Knee ext       Knee Flex       Ankle PF 45 pain  60   Ankle DF +5 +10   Ankle In 35 40   Ankle Ev 25  25   Strength  LEFT RIGHT   HIP Flexors 4+ 4+   HIP Abductors last step     Balance board lat taps UE supp   SLS with 3D LE position with rebounder toss 2# MB   SLS mini squat glider quick flex/ext  ea Cue for stance leg control   LSD w/ heel off  Posterior reach with heel off    Hands on wall, cues for foot tap; resume NV   Therapeutic Activity (78101)                         Modalities     Ionto clinic phoreser            Therapeutic Exercise and NMR EXR  [x] (39469) Provided verbal/tactile cueing for activities related to strengthening, flexibility, endurance, ROM for improvements in LE, proximal hip, and core control with self care, mobility, lifting, ambulation. [x] (33310) Provided verbal/tactile cueing for activities related to improving balance, coordination, kinesthetic sense, posture, motor skill, proprioception  to assist with LE, proximal hip, and core control in self care, mobility, lifting, ambulation and eccentric single leg control.      NMR and Therapeutic Activities:    [x] (12719 or 26236) Provided verbal/tactile cueing for activities related to improving balance, coordination, kinesthetic sense, posture, motor skill, proprioception and motor activation to allow for proper function of core, proximal hip and LE with self care and ADLs  [] (70454) Gait Re-education- Provided training and instruction to the patient for proper LE, core and proximal hip recruitment and positioning and eccentric body weight control with ambulation re-education including up and down stairs     Home Exercise Program:    [] (48197) Reviewed/Progressed HEP activities related to strengthening, flexibility, endurance, ROM of core, proximal hip and LE for functional self-care, mobility, lifting and ambulation/stair navigation   [] (85529)Reviewed/Progressed HEP activities related to improving balance, coordination, kinesthetic sense, posture, motor skill, proprioception of core, proximal hip and LE for self care, mobility, lifting, and ambulation/stair navigation      Manual Treatments: Adjusted  3. Patient will demonstrate an increase in Strength to good proximal hip strength and control, within 5lb HHD in LE to allow for proper functional mobility as indicated by patients Functional Deficits. [x] Progressing: [] Met: [] Not Met: [] Adjusted  4. Patient will return to being able to ambulate, ascend and descend stairs   without increased symptoms or restriction. [x] Progressing: [] Met: [] Not Met: [] Adjusted  5. Pt able to return to work as      [] Progressing: [x] Met: [] Not Met: [] Adjusted    Progression Towards Functional goals:  [x] Patient is progressing as expected towards functional goals listed. [] Progression is slowed due to complexities listed. [] Progression has been slowed due to co-morbidities. [] Plan just implemented, too soon to assess goals progression  [] Other:     Overall Progression Towards Functional goals/ Treatment Progress Update:  [x] Patient is progressing as expected towards functional goals listed. [] Progression is slowed due to complexities/Impairments listed. [] Progression has been slowed due to co-morbidities. [] Plan just implemented, too soon to assess goals progression <30days   [] Goals require adjustment due to lack of progress  [] Patient is not progressing as expected and requires additional follow up with physician  [] Other    Prognosis for POC: [x] Good [] Fair  [] Poor      Patient requires continued skilled intervention: [x] Yes  [] No    Treatment/Activity Tolerance:  [x] Patient able to complete treatment  [] Patient limited by fatigue  [] Patient limited by pain    [] Patient limited by other medical complications  [] Other:     ASSESSMENT: Pt showing progress with daily pain levels, ROM and strength, but ongoing calf and foot tightness and pain end of day with work and functional hip weakness that would benefit from continued skilled PT to address.   Ionto has continued to help pt, therefore will continue use in POC 1 more month. Return to Play: (if applicable)   []  Stage 1: Intro to Strength   []  Stage 2: Return to Run and Strength   []  Stage 3: Return to Jump and Strength   []  Stage 4: Dynamic Strength and Agility   []  Stage 5: Sport Specific Training     []  Ready to Return to Play, Meets All Above Stages   []  Not Ready for Return to Sports   Comments:                               PLAN: Cont POC, wean to 1x/week still with use of ionto. [x] Continue per plan of care [] Alter current plan (see comments above)  [] Plan of care initiated [] Hold pending MD visit [] Discharge      Electronically signed by:  Everett Dillon PT, DPT    Note: If patient does not return for scheduled/ recommended follow up visits, this note will serve as a discharge from care along with most recent update on progress. Access Code: [email or web address]   URL: [email or web address]   Date: 07/24/2020   Prepared by: Beth Lundberg     Exercises   Long Sitting Calf Stretch with Strap - 3 reps - 1 sets - 30 hold - 2x daily - 7x weekly   Long Sitting Soleus Stretch on Bolster with Strap - 3 reps - 1 sets - 30 hold - 2x daily - 7x weekly   Seated Plantar Fascia Stretch - 5 reps - 1 sets - 10 hold - 2x daily - 7x weekly   Seated Arch Lifts - 10 reps - 1 sets - 5 hold - 2x daily - 7x weekly   Long Sitting Ankle Eversion with Resistance - 10 reps - 2 sets - 1x daily - 7x weekly   Long Sitting Ankle Inversion with Resistance - 10 reps - 2 sets - 1x daily - 7x weekly

## 2020-10-20 ENCOUNTER — HOSPITAL ENCOUNTER (OUTPATIENT)
Dept: WOMENS IMAGING | Age: 60
Discharge: HOME | End: 2020-10-20
Payer: COMMERCIAL

## 2020-10-20 PROCEDURE — 77063 BREAST TOMOSYNTHESIS BI: CPT

## 2021-12-06 ENCOUNTER — HOSPITAL ENCOUNTER (OUTPATIENT)
Dept: WOMENS IMAGING | Age: 61
Discharge: HOME | End: 2021-12-06
Payer: COMMERCIAL

## 2021-12-06 VITALS — BODY MASS INDEX: 29.44 KG/M2 | WEIGHT: 160 LBS | HEIGHT: 62 IN

## 2021-12-06 DIAGNOSIS — Z12.31 VISIT FOR SCREENING MAMMOGRAM: ICD-10-CM

## 2021-12-06 PROCEDURE — 77063 BREAST TOMOSYNTHESIS BI: CPT

## 2022-10-21 ENCOUNTER — APPOINTMENT (OUTPATIENT)
Dept: CT IMAGING | Age: 62
End: 2022-10-21
Payer: COMMERCIAL

## 2022-10-21 ENCOUNTER — HOSPITAL ENCOUNTER (EMERGENCY)
Age: 62
Discharge: HOME | End: 2022-10-21
Payer: COMMERCIAL

## 2022-10-21 VITALS
SYSTOLIC BLOOD PRESSURE: 134 MMHG | TEMPERATURE: 98.3 F | HEIGHT: 63 IN | BODY MASS INDEX: 29.23 KG/M2 | WEIGHT: 165 LBS | DIASTOLIC BLOOD PRESSURE: 69 MMHG | OXYGEN SATURATION: 99 % | HEART RATE: 68 BPM | RESPIRATION RATE: 16 BRPM

## 2022-10-21 DIAGNOSIS — S10.91XA ABRASION OF NECK, INITIAL ENCOUNTER: Primary | ICD-10-CM

## 2022-10-21 DIAGNOSIS — S10.93XA CONTUSION OF NECK, INITIAL ENCOUNTER: ICD-10-CM

## 2022-10-21 LAB
A/G RATIO: 1.7 (ref 1.1–2.2)
ALBUMIN SERPL-MCNC: 4.5 G/DL (ref 3.4–5)
ALP BLD-CCNC: 60 U/L (ref 40–129)
ALT SERPL-CCNC: 18 U/L (ref 10–40)
ANION GAP SERPL CALCULATED.3IONS-SCNC: 9 MMOL/L (ref 3–16)
AST SERPL-CCNC: 25 U/L (ref 15–37)
BASOPHILS ABSOLUTE: 0.1 K/UL (ref 0–0.2)
BASOPHILS RELATIVE PERCENT: 0.7 %
BILIRUB SERPL-MCNC: 0.3 MG/DL (ref 0–1)
BUN BLDV-MCNC: 15 MG/DL (ref 7–20)
CALCIUM SERPL-MCNC: 9.5 MG/DL (ref 8.3–10.6)
CHLORIDE BLD-SCNC: 103 MMOL/L (ref 99–110)
CO2: 26 MMOL/L (ref 21–32)
CREAT SERPL-MCNC: 0.8 MG/DL (ref 0.6–1.2)
EOSINOPHILS ABSOLUTE: 0.3 K/UL (ref 0–0.6)
EOSINOPHILS RELATIVE PERCENT: 3.2 %
GFR SERPL CREATININE-BSD FRML MDRD: >60 ML/MIN/{1.73_M2}
GLUCOSE BLD-MCNC: 97 MG/DL (ref 70–99)
HCT VFR BLD CALC: 43.7 % (ref 36–48)
HEMOGLOBIN: 14.8 G/DL (ref 12–16)
LYMPHOCYTES ABSOLUTE: 1.7 K/UL (ref 1–5.1)
LYMPHOCYTES RELATIVE PERCENT: 18.8 %
MCH RBC QN AUTO: 31.2 PG (ref 26–34)
MCHC RBC AUTO-ENTMCNC: 33.8 G/DL (ref 31–36)
MCV RBC AUTO: 92.3 FL (ref 80–100)
MONOCYTES ABSOLUTE: 0.5 K/UL (ref 0–1.3)
MONOCYTES RELATIVE PERCENT: 5.8 %
NEUTROPHILS ABSOLUTE: 6.6 K/UL (ref 1.7–7.7)
NEUTROPHILS RELATIVE PERCENT: 71.5 %
PDW BLD-RTO: 13.6 % (ref 12.4–15.4)
PLATELET # BLD: 183 K/UL (ref 135–450)
PMV BLD AUTO: 9.5 FL (ref 5–10.5)
POTASSIUM REFLEX MAGNESIUM: 4.6 MMOL/L (ref 3.5–5.1)
RBC # BLD: 4.74 M/UL (ref 4–5.2)
SODIUM BLD-SCNC: 138 MMOL/L (ref 136–145)
TOTAL PROTEIN: 7.1 G/DL (ref 6.4–8.2)
WBC # BLD: 9.2 K/UL (ref 4–11)

## 2022-10-21 PROCEDURE — 85025 COMPLETE CBC W/AUTO DIFF WBC: CPT

## 2022-10-21 PROCEDURE — 6360000004 HC RX CONTRAST MEDICATION: Performed by: PHYSICIAN ASSISTANT

## 2022-10-21 PROCEDURE — 96374 THER/PROPH/DIAG INJ IV PUSH: CPT

## 2022-10-21 PROCEDURE — 6360000002 HC RX W HCPCS: Performed by: PHYSICIAN ASSISTANT

## 2022-10-21 PROCEDURE — 80053 COMPREHEN METABOLIC PANEL: CPT

## 2022-10-21 PROCEDURE — 70491 CT SOFT TISSUE NECK W/DYE: CPT

## 2022-10-21 PROCEDURE — 70450 CT HEAD/BRAIN W/O DYE: CPT

## 2022-10-21 PROCEDURE — 99285 EMERGENCY DEPT VISIT HI MDM: CPT

## 2022-10-21 RX ORDER — KETOROLAC TROMETHAMINE 30 MG/ML
15 INJECTION, SOLUTION INTRAMUSCULAR; INTRAVENOUS ONCE
Status: COMPLETED | OUTPATIENT
Start: 2022-10-21 | End: 2022-10-21

## 2022-10-21 RX ADMIN — KETOROLAC TROMETHAMINE 15 MG: 30 INJECTION, SOLUTION INTRAMUSCULAR at 16:34

## 2022-10-21 RX ADMIN — IOPAMIDOL 75 ML: 755 INJECTION, SOLUTION INTRAVENOUS at 17:24

## 2022-10-21 ASSESSMENT — PAIN DESCRIPTION - ORIENTATION: ORIENTATION: OTHER (COMMENT)

## 2022-10-21 ASSESSMENT — PAIN SCALES - GENERAL
PAINLEVEL_OUTOF10: 3
PAINLEVEL_OUTOF10: 7
PAINLEVEL_OUTOF10: 8
PAINLEVEL_OUTOF10: 3

## 2022-10-21 ASSESSMENT — ENCOUNTER SYMPTOMS
RHINORRHEA: 0
ABDOMINAL PAIN: 0
EYE PAIN: 0
CONSTIPATION: 0
SORE THROAT: 1
VOMITING: 0
DIARRHEA: 0
TROUBLE SWALLOWING: 1
COUGH: 0
NAUSEA: 0
BACK PAIN: 0
SHORTNESS OF BREATH: 0

## 2022-10-21 ASSESSMENT — PAIN DESCRIPTION - DESCRIPTORS
DESCRIPTORS: SORE
DESCRIPTORS: ACHING

## 2022-10-21 ASSESSMENT — PAIN DESCRIPTION - LOCATION
LOCATION: HEAD;NECK
LOCATION: HEAD

## 2022-10-21 ASSESSMENT — PAIN - FUNCTIONAL ASSESSMENT: PAIN_FUNCTIONAL_ASSESSMENT: 0-10

## 2022-10-21 ASSESSMENT — PAIN DESCRIPTION - FREQUENCY: FREQUENCY: CONTINUOUS

## 2022-10-21 ASSESSMENT — PAIN DESCRIPTION - PAIN TYPE: TYPE: ACUTE PAIN

## 2022-10-21 NOTE — Clinical Note
Olayinka Velazquez was seen and treated in our emergency department on 10/21/2022. She may return to work on 10/24/2022. If you have any questions or concerns, please don't hesitate to call.       Montse Zacarias PA-C
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
## 2022-10-21 NOTE — DISCHARGE INSTRUCTIONS
ICE INSTRUCTIONS  ICE CUBES OR CRUSHED ICE IN A ZIPLOCK FREEZER BAG. APPLY TO AFFECTED AREA 20 MIN ON AND 40 MIN OFF AS OFTEN AS POSSIBLE FOR THE NEXT 3-5 DAYS.    (APPLY DIRECTLY TO SKIN.   IF TOO PAINFUL PLACE WET CLOTH IN BETWEEN ICE AND SKIN.)

## 2022-12-21 ENCOUNTER — HOSPITAL ENCOUNTER (OUTPATIENT)
Dept: WOMENS IMAGING | Age: 62
Discharge: HOME | End: 2022-12-21
Payer: COMMERCIAL

## 2022-12-21 DIAGNOSIS — Z12.31 VISIT FOR SCREENING MAMMOGRAM: ICD-10-CM

## 2022-12-21 PROCEDURE — 77063 BREAST TOMOSYNTHESIS BI: CPT

## 2023-12-27 ENCOUNTER — HOSPITAL ENCOUNTER (OUTPATIENT)
Dept: WOMENS IMAGING | Age: 63
Discharge: HOME | End: 2023-12-27
Payer: COMMERCIAL

## 2023-12-27 VITALS — HEIGHT: 62 IN | BODY MASS INDEX: 25.4 KG/M2 | WEIGHT: 138 LBS

## 2023-12-27 DIAGNOSIS — Z12.31 ENCOUNTER FOR SCREENING MAMMOGRAM FOR MALIGNANT NEOPLASM OF BREAST: ICD-10-CM

## 2023-12-27 PROCEDURE — 77063 BREAST TOMOSYNTHESIS BI: CPT
